# Patient Record
Sex: FEMALE | Race: WHITE | NOT HISPANIC OR LATINO | Employment: UNEMPLOYED | URBAN - METROPOLITAN AREA
[De-identification: names, ages, dates, MRNs, and addresses within clinical notes are randomized per-mention and may not be internally consistent; named-entity substitution may affect disease eponyms.]

---

## 2019-04-16 LAB — EXTERNAL CHLAMYDIA RESULT: NOT DETECTED

## 2019-04-19 LAB
EXTERNAL HIV SCREEN: NORMAL
HCV AB SER-ACNC: NONREACTIVE

## 2021-06-30 ENCOUNTER — OFFICE VISIT (OUTPATIENT)
Dept: OBGYN CLINIC | Facility: CLINIC | Age: 36
End: 2021-06-30
Payer: COMMERCIAL

## 2021-06-30 ENCOUNTER — VBI (OUTPATIENT)
Dept: ADMINISTRATIVE | Facility: OTHER | Age: 36
End: 2021-06-30

## 2021-06-30 VITALS
DIASTOLIC BLOOD PRESSURE: 62 MMHG | WEIGHT: 122.6 LBS | SYSTOLIC BLOOD PRESSURE: 110 MMHG | BODY MASS INDEX: 23.15 KG/M2 | HEIGHT: 61 IN

## 2021-06-30 DIAGNOSIS — Q64.70 ABNORMALITY OF URETHRAL MEATUS: Primary | ICD-10-CM

## 2021-06-30 PROCEDURE — 99214 OFFICE O/P EST MOD 30 MIN: CPT | Performed by: OBSTETRICS & GYNECOLOGY

## 2021-06-30 NOTE — TELEPHONE ENCOUNTER
Upon review of the In Basket request we were able to locate, review, and update the patient chart as requested for Chlamydia, Hepatitis C , HIV and Pap Smear (HPV) aka Cervical Cancer Screening  Any additional questions or concerns should be emailed to the Practice Liaisons via [email or web address] email, please do not reply via In Basket      Thank you  Poonam Woodruff

## 2021-07-02 NOTE — PROGRESS NOTES
Assessment/Plan:      Diagnoses and all orders for this visit:    Abnormality of urethral meatus      Informed patient of exam being negative for prolapse of any kind  Prominent urethral meatus might be a normal variance but patient believes the appearance have changed since delivery  Advised pt to consult Urologist or Uro/Gyn for further evaluation  At least 30 minutes spent in face to face conversation and exam   Additional time spent reviewing pt's delivery records and documenting    Subjective:     Patient ID: Marilee Correa is a 39 y o  female  Pt presents due concern about having uterine prolapse  Pt states her friend had talked about uterine prolapse last week and last night patient heard/felt a pop in her pelvis/vagina and patient feels something bulging out of her vagina  Denies having any pelvic pain, increased frequency, urgency, leakage of urine with valsalva  Pt states she had Forceps assisted vaginal delivery and concerned that she might have weak/stretched pelvic muscles due to forceps use  Review of Systems   Constitutional: Negative for activity change  Genitourinary: Negative for difficulty urinating, dyspareunia, dysuria, frequency, urgency and vaginal pain  Objective:     Physical Exam  Vitals and nursing note reviewed  HENT:      Head: Normocephalic  Abdominal:      General: There is no distension  Palpations: Abdomen is soft  There is no mass  Tenderness: There is no abdominal tenderness  There is no rebound  Genitourinary:     General: Normal vulva  Exam position: Lithotomy position  Labia:         Right: No rash, tenderness or lesion  Left: No rash, tenderness or lesion  Urethra: No urethral pain or urethral lesion  Vagina: Normal  No vaginal discharge  Cervix: No cervical motion tenderness, lesion or erythema        Uterus: Normal        Adnexa: Right adnexa normal and left adnexa normal       Rectum: No external hemorrhoid  Comments: Prominent urethral meatus, no erythema or tenderness or discharge  Musculoskeletal:      Right lower leg: No edema  Left lower leg: No edema  Skin:     General: Skin is warm  Neurological:      Mental Status: She is alert and oriented to person, place, and time  Psychiatric:         Mood and Affect: Mood normal          Behavior: Behavior normal          Thought Content:  Thought content normal

## 2021-12-10 ENCOUNTER — INITIAL PRENATAL (OUTPATIENT)
Dept: OBGYN CLINIC | Facility: CLINIC | Age: 36
End: 2021-12-10

## 2021-12-10 VITALS
WEIGHT: 124 LBS | DIASTOLIC BLOOD PRESSURE: 80 MMHG | SYSTOLIC BLOOD PRESSURE: 118 MMHG | BODY MASS INDEX: 23.41 KG/M2 | HEIGHT: 61 IN

## 2021-12-10 DIAGNOSIS — Z3A.09 9 WEEKS GESTATION OF PREGNANCY: ICD-10-CM

## 2021-12-10 DIAGNOSIS — Z36.89 ENCOUNTER FOR OTHER SPECIFIED ANTENATAL SCREENING: Primary | ICD-10-CM

## 2021-12-10 DIAGNOSIS — O09.521 ELDERLY MULTIGRAVIDA IN FIRST TRIMESTER: ICD-10-CM

## 2021-12-10 LAB
EXTERNAL CHLAMYDIA SCREEN: NEGATIVE
EXTERNAL GONORRHEA SCREEN: NEGATIVE
SL AMB  POCT GLUCOSE, UA: NORMAL
SL AMB POCT URINE PROTEIN: NORMAL

## 2021-12-10 PROCEDURE — 99024 POSTOP FOLLOW-UP VISIT: CPT | Performed by: OBSTETRICS & GYNECOLOGY

## 2021-12-10 PROCEDURE — OBC: Performed by: OBSTETRICS & GYNECOLOGY

## 2021-12-11 LAB
C TRACH RRNA SPEC QL NAA+PROBE: NEGATIVE
N GONORRHOEA RRNA SPEC QL NAA+PROBE: NEGATIVE

## 2021-12-17 ENCOUNTER — TELEMEDICINE (OUTPATIENT)
Dept: PERINATAL CARE | Facility: CLINIC | Age: 36
End: 2021-12-17

## 2021-12-17 DIAGNOSIS — Z31.5 ENCOUNTER FOR PROCREATIVE GENETIC COUNSELING: ICD-10-CM

## 2021-12-17 DIAGNOSIS — O09.529 ANTEPARTUM MULTIGRAVIDA OF ADVANCED MATERNAL AGE: Primary | ICD-10-CM

## 2021-12-17 LAB
EXTERNAL ANTIBODY SCREEN: NORMAL
EXTERNAL HEMOGLOBIN: 12.6 G/DL
EXTERNAL HEPATITIS B SURFACE ANTIGEN: NEGATIVE
EXTERNAL HIV-1/2 AB-AG: NORMAL
EXTERNAL PLATELET COUNT: 230 K/ΜL
EXTERNAL RH FACTOR: POSITIVE
EXTERNAL RUBELLA IGG QUANTITATION: NORMAL
EXTERNAL SYPHILIS RPR SCREEN: NORMAL

## 2021-12-19 LAB
ABO GROUP BLD: ABNORMAL
APPEARANCE UR: CLEAR
BACTERIA UR CULT: NORMAL
BACTERIA URNS QL MICRO: NORMAL
BASOPHILS # BLD AUTO: 0 X10E3/UL (ref 0–0.2)
BASOPHILS NFR BLD AUTO: 0 %
BILIRUB UR QL STRIP: NEGATIVE
BLD GP AB SCN SERPL QL: NEGATIVE
CASTS URNS QL MICRO: NORMAL /LPF
COLOR UR: YELLOW
EOSINOPHIL # BLD AUTO: 0.2 X10E3/UL (ref 0–0.4)
EOSINOPHIL NFR BLD AUTO: 2 %
EPI CELLS #/AREA URNS HPF: NORMAL /HPF (ref 0–10)
ERYTHROCYTE [DISTWIDTH] IN BLOOD BY AUTOMATED COUNT: 11.9 % (ref 11.7–15.4)
GLUCOSE UR QL: NEGATIVE
HBV SURFACE AG SERPL QL IA: NEGATIVE
HCT VFR BLD AUTO: 37.9 % (ref 34–46.6)
HCV AB S/CO SERPL IA: <0.1 S/CO RATIO (ref 0–0.9)
HGB BLD-MCNC: 12.6 G/DL (ref 11.1–15.9)
HGB UR QL STRIP: NEGATIVE
HIV 1+2 AB+HIV1 P24 AG SERPL QL IA: NON REACTIVE
IMM GRANULOCYTES # BLD: 0.1 X10E3/UL (ref 0–0.1)
IMM GRANULOCYTES NFR BLD: 1 %
KETONES UR QL STRIP: NEGATIVE
LEUKOCYTE ESTERASE UR QL STRIP: NEGATIVE
LYMPHOCYTES # BLD AUTO: 2.2 X10E3/UL (ref 0.7–3.1)
LYMPHOCYTES NFR BLD AUTO: 20 %
Lab: NORMAL
MCH RBC QN AUTO: 30 PG (ref 26.6–33)
MCHC RBC AUTO-ENTMCNC: 33.2 G/DL (ref 31.5–35.7)
MCV RBC AUTO: 90 FL (ref 79–97)
MICRO URNS: NORMAL
MICRO URNS: NORMAL
MONOCYTES # BLD AUTO: 0.6 X10E3/UL (ref 0.1–0.9)
MONOCYTES NFR BLD AUTO: 6 %
NEUTROPHILS # BLD AUTO: 7.8 X10E3/UL (ref 1.4–7)
NEUTROPHILS NFR BLD AUTO: 71 %
NITRITE UR QL STRIP: NEGATIVE
PH UR STRIP: 7 [PH] (ref 5–7.5)
PLATELET # BLD AUTO: 230 X10E3/UL (ref 150–450)
PROT UR QL STRIP: NEGATIVE
RBC # BLD AUTO: 4.2 X10E6/UL (ref 3.77–5.28)
RBC #/AREA URNS HPF: NORMAL /HPF (ref 0–2)
RH BLD: POSITIVE
RPR SER QL: NON REACTIVE
RUBV IGG SERPL IA-ACNC: <0.9 INDEX
SP GR UR: 1.01 (ref 1–1.03)
UROBILINOGEN UR STRIP-ACNC: 0.2 MG/DL (ref 0.2–1)
WBC # BLD AUTO: 10.9 X10E3/UL (ref 3.4–10.8)
WBC #/AREA URNS HPF: NORMAL /HPF (ref 0–5)

## 2022-01-07 ENCOUNTER — ROUTINE PRENATAL (OUTPATIENT)
Dept: OBGYN CLINIC | Facility: CLINIC | Age: 37
End: 2022-01-07
Payer: COMMERCIAL

## 2022-01-07 ENCOUNTER — ROUTINE PRENATAL (OUTPATIENT)
Dept: PERINATAL CARE | Facility: OTHER | Age: 37
End: 2022-01-07
Payer: COMMERCIAL

## 2022-01-07 VITALS
BODY MASS INDEX: 24.17 KG/M2 | SYSTOLIC BLOOD PRESSURE: 137 MMHG | HEART RATE: 67 BPM | HEIGHT: 61 IN | DIASTOLIC BLOOD PRESSURE: 77 MMHG | WEIGHT: 128 LBS

## 2022-01-07 VITALS
DIASTOLIC BLOOD PRESSURE: 72 MMHG | BODY MASS INDEX: 24.17 KG/M2 | HEIGHT: 61 IN | WEIGHT: 128 LBS | SYSTOLIC BLOOD PRESSURE: 112 MMHG

## 2022-01-07 DIAGNOSIS — Z36.82 ENCOUNTER FOR NUCHAL TRANSLUCENCY TESTING: ICD-10-CM

## 2022-01-07 DIAGNOSIS — Z36.89 ENCOUNTER FOR OTHER SPECIFIED ANTENATAL SCREENING: ICD-10-CM

## 2022-01-07 DIAGNOSIS — O09.521 ELDERLY MULTIGRAVIDA IN FIRST TRIMESTER: ICD-10-CM

## 2022-01-07 DIAGNOSIS — Z23 FLU VACCINE NEED: ICD-10-CM

## 2022-01-07 DIAGNOSIS — Z3A.13 13 WEEKS GESTATION OF PREGNANCY: Primary | ICD-10-CM

## 2022-01-07 DIAGNOSIS — O09.521 ELDERLY MULTIGRAVIDA IN FIRST TRIMESTER: Primary | ICD-10-CM

## 2022-01-07 DIAGNOSIS — Z3A.13 13 WEEKS GESTATION OF PREGNANCY: ICD-10-CM

## 2022-01-07 PROBLEM — O09.899 RUBELLA NON-IMMUNE STATUS, ANTEPARTUM: Status: ACTIVE | Noted: 2022-01-07

## 2022-01-07 PROBLEM — Z3A.09 9 WEEKS GESTATION OF PREGNANCY: Status: RESOLVED | Noted: 2021-12-10 | Resolved: 2022-01-07

## 2022-01-07 PROBLEM — Z28.39 RUBELLA NON-IMMUNE STATUS, ANTEPARTUM: Status: ACTIVE | Noted: 2022-01-07

## 2022-01-07 LAB
SL AMB  POCT GLUCOSE, UA: NORMAL
SL AMB POCT URINE PROTEIN: NORMAL

## 2022-01-07 PROCEDURE — 90686 IIV4 VACC NO PRSV 0.5 ML IM: CPT | Performed by: STUDENT IN AN ORGANIZED HEALTH CARE EDUCATION/TRAINING PROGRAM

## 2022-01-07 PROCEDURE — 99241 PR OFFICE CONSULTATION NEW/ESTAB PATIENT 15 MIN: CPT | Performed by: OBSTETRICS & GYNECOLOGY

## 2022-01-07 PROCEDURE — 76813 OB US NUCHAL MEAS 1 GEST: CPT | Performed by: OBSTETRICS & GYNECOLOGY

## 2022-01-07 PROCEDURE — 90471 IMMUNIZATION ADMIN: CPT | Performed by: STUDENT IN AN ORGANIZED HEALTH CARE EDUCATION/TRAINING PROGRAM

## 2022-01-07 PROCEDURE — 76801 OB US < 14 WKS SINGLE FETUS: CPT | Performed by: OBSTETRICS & GYNECOLOGY

## 2022-01-07 PROCEDURE — PNV: Performed by: STUDENT IN AN ORGANIZED HEALTH CARE EDUCATION/TRAINING PROGRAM

## 2022-01-07 NOTE — PROGRESS NOTES
Routine Prenatal Visit  [address], [address], [place] [place], [address]    Assessment/Plan:  David Allen is a 39y o  year old  at 13w0d who presents for routine prenatal visit  1  13 weeks gestation of pregnancy  -     POCT urine dip    2  Encounter for other specified  screening  -     Alpha fetoprotein, maternal; Future  -     Alpha fetoprotein, maternal    3  Elderly multigravida in first trimester  Assessment & Plan:  - PTL/PPROM/Bleeding precautions given  - Early anatomy US performed this morning with MFM; report pending  Level II ultrasound scheduled  - COVID vaccination recommended  - Flu shot administered today  - msAFP order provided  Reviewed timing 15-20 weeks (16-18 preferred)  - Problem list updated  - PMH, PSH, medications reviewed and updated as needed  - Return to office in 4 wk(s) for routine prenatal care        4  Flu vaccine need  -     FLUZONE: influenza vaccine, quadrivalent, 0 5 mL      Subjective:   Connie Connor is a 39 y o  Jamala Dubin who presents for routine prenatal care at 13w0d  Complaints today: No  LOF: No; VB: No; Contractions: No; FM: Too early    Objective:  /72 (BP Location: Left arm, Patient Position: Sitting, Cuff Size: Standard)   Ht 5' 1" (1 549 m)   Wt 58 1 kg (128 lb)   LMP 10/02/2021 (Exact Date)   BMI 24 19 kg/m²     General: Well appearing, no distress  Respiratory: Unlabored breathing  Cardiovascular: Regular rate  Abdomen: Soft, gravid, nontender  Extremities: Warm and well perfused  Non tender  Pregravid Weight/BMI: Pregravid weight not on file (BMI Could not be calculated)  Current Weight: 58 1 kg (128 lb)   Total Weight Gain: Not found       Pre- Vitals      Most Recent Value   Prenatal Assessment    Prenatal Vitals    Blood Pressure 112/72   Weight - Scale 58 1 kg (128 lb)   Urine Albumin/Glucose    Dilation/Effacement/Station    Vaginal Drainage    Edema            Sarah Amado MD  2022 1:07 PM

## 2022-01-07 NOTE — LETTER
January 21, 2022     MD Lan Munoz [address]  [address]  [place]    Patient: Mandy Dukes   YOB: 1985   Date of Visit: 1/7/2022       Dear Dr Arthur Apo: Thank you for referring Yi Palmer to me for evaluation  Below are my notes for this consultation  If you have questions, please do not hesitate to call me  I look forward to following your patient along with you  Sincerely,        Sandra Ruano MD        CC: No Recipients  Sandra Ruano MD  1/21/2022  8:16 AM  Sign when Signing Visit  [id number]  MD Lan Gonzalez [address]  [address]  [place],  [address]     Thank you for referring your Mandy Dukes for a Maternal-Fetal Medicine Consultation:  Below is my consultation  Thank you very much for requesting a consultation on this very nice patient for the indication genetic screening  This is the patient's 2nd pregnancy  First pregnancy resulted in a full-term vaginal delivery without complications  She has no significant medical or surgical history  Substance use history is unremarkable and family medical history is unremarkable  A review of systems is otherwise negative  The patient had noninvasive prenatal testing through [address] plus test   Her results were normal, placing her in a very low risk category  The sensitivity of detecting Trisomy 21 with this test is 99 1% with a specificity of 99 9%  The sensitivity of detecting Trisomy 18 is >99 9% with a specificity of 99 6%  The sensitivity of detecting Trisomy 13 is 91 7% with a specificity of 99 7%  Her negative result is very reassuring that the likelihood of her having a fetus with the aforementioned Trisomies is very low  We discussed current data surrounding COVID-19 vaccination  We discussed follow-up in detail and I recommend she return at 20 weeks for a detailed fetal anatomic evaluation      Thank you very much for allowing us to participate in the care of this very nice patient  Should you have any questions, please do not hesitate to contact our office  Please note, in addition to the time spent discussing the results of the ultrasound, I spent approximately 15 minutes of face-to-face time with the patient, greater than 50% of which was spent in counseling and the coordination of care for this patient  Akshat Thomas MD  Attending Physician, [place]

## 2022-01-07 NOTE — ASSESSMENT & PLAN NOTE
- PTL/PPROM/Bleeding precautions given  - Early anatomy US performed this morning with MFM; report pending  Level II ultrasound scheduled  - COVID vaccination recommended  - Flu shot administered today  - msAFP order provided   Reviewed timing 15-20 weeks (16-18 preferred)  - Problem list updated  - PMH, PSH, medications reviewed and updated as needed  - Return to office in 4 wk(s) for routine prenatal care

## 2022-01-21 NOTE — PROGRESS NOTES
CONSULT NOTE    Efra Del Rosario MD  [facility] [address]  [address]  [place],  [address]     Thank you for referring your Clearnce Sportsman for a Maternal-Fetal Medicine Consultation:  Below is my consultation  Thank you very much for requesting a consultation on this very nice patient for the indication genetic screening  This is the patient's 2nd pregnancy  First pregnancy resulted in a full-term vaginal delivery without complications  She has no significant medical or surgical history  Substance use history is unremarkable and family medical history is unremarkable  A review of systems is otherwise negative  The patient had noninvasive prenatal testing through [address] [facility] plus test   Her results were normal, placing her in a very low risk category  The sensitivity of detecting Trisomy 21 with this test is 99 1% with a specificity of 99 9%  The sensitivity of detecting Trisomy 18 is >99 9% with a specificity of 99 6%  The sensitivity of detecting Trisomy 13 is 91 7% with a specificity of 99 7%  Her negative result is very reassuring that the likelihood of her having a fetus with the aforementioned Trisomies is very low  We discussed current data surrounding COVID-19 vaccination  We discussed follow-up in detail and I recommend she return at 20 weeks for a detailed fetal anatomic evaluation  Thank you very much for allowing us to participate in the care of this very nice patient  Should you have any questions, please do not hesitate to contact our office  Please note, in addition to the time spent discussing the results of the ultrasound, I spent approximately 15 minutes of face-to-face time with the patient, greater than 50% of which was spent in counseling and the coordination of care for this patient  Akshat Haro MD  Attending Physician, [place]

## 2022-01-31 LAB
2ND TRIMESTER 4 SCREEN SERPL-IMP: NORMAL
AFP ADJ MOM SERPL: 0.47
AFP INTERP AMN-IMP: NORMAL
AFP INTERP SERPL-IMP: NORMAL
AFP INTERP SERPL-IMP: NORMAL
AFP SERPL-MCNC: 17.7 NG/ML
AGE AT DELIVERY: 37.5 YR
GA METHOD: NORMAL
GA: 16 WEEKS
IDDM PATIENT QL: NO
MULTIPLE PREGNANCY: NO
NEURAL TUBE DEFECT RISK FETUS: NORMAL %

## 2022-02-04 ENCOUNTER — ROUTINE PRENATAL (OUTPATIENT)
Dept: OBGYN CLINIC | Facility: CLINIC | Age: 37
End: 2022-02-04

## 2022-02-04 VITALS
SYSTOLIC BLOOD PRESSURE: 98 MMHG | WEIGHT: 135.2 LBS | BODY MASS INDEX: 25.53 KG/M2 | HEIGHT: 61 IN | DIASTOLIC BLOOD PRESSURE: 60 MMHG

## 2022-02-04 DIAGNOSIS — O09.521 ELDERLY MULTIGRAVIDA IN FIRST TRIMESTER: Primary | ICD-10-CM

## 2022-02-04 DIAGNOSIS — O09.899 RUBELLA NON-IMMUNE STATUS, ANTEPARTUM: ICD-10-CM

## 2022-02-04 DIAGNOSIS — Z28.39 RUBELLA NON-IMMUNE STATUS, ANTEPARTUM: ICD-10-CM

## 2022-02-04 DIAGNOSIS — Z3A.17 17 WEEKS GESTATION OF PREGNANCY: ICD-10-CM

## 2022-02-04 LAB
SL AMB  POCT GLUCOSE, UA: NEGATIVE
SL AMB POCT URINE PROTEIN: NEGATIVE

## 2022-02-04 PROCEDURE — PNV: Performed by: STUDENT IN AN ORGANIZED HEALTH CARE EDUCATION/TRAINING PROGRAM

## 2022-02-04 NOTE — PROGRESS NOTES
Routine Prenatal Visit  [address] [facility], [address], [facility], [address]    Assessment/Plan:  Ash Smith is a 40y o  year old  at 17w0d who presents for routine prenatal visit  1  Elderly multigravida in first trimester    2  Rubella non-immune status, antepartum  Assessment & Plan:  - Plan to vaccinate postpartum  Patient aware  3  17 weeks gestation of pregnancy  -     POCT urine dip    - Prenatal lab results reviewed  - Aneuploidy/ONTD screening reviewed  - First trimester MFM consult report reviewed  Level II ultrasound is scheduled  - Patient has previously been counseled regarding recommendation for vaccination against [id number] and has declined  Flu vaccine previously administered  - Problem list updated, results console reviewed and updated with pertinent prenatal labs  - PMH, PSH, medications reviewed and updated as needed  - Return to office in 4 wk(s) for routine prenatal care  Subjective:   Robert Jacome is a 40 y o   who presents for routine prenatal care at 17w0d  Complaints today: None  LOF: No; VB: No; Contractions: No; FM: Flutters    Objective:  BP 98/60 (BP Location: Left arm, Patient Position: Sitting, Cuff Size: Standard)   Ht 5' 1" (1 549 m)   Wt 61 3 kg (135 lb 3 2 oz)   LMP 10/02/2021 (Exact Date)   BMI 25 55 kg/m²     General: Well appearing, no distress  Respiratory: Unlabored breathing  Cardiovascular: Regular rate  Abdomen: Soft, gravid, nontender  Extremities: Warm and well perfused  Non tender  Pregravid Weight/BMI: Pregravid weight not on file (BMI Could not be calculated)  Current Weight: 61 3 kg (135 lb 3 2 oz)   Total Weight Gain: Not found       Pre-Jack Vitals      Most Recent Value   Prenatal Assessment    Fetal Heart Rate 150   Prenatal Vitals    Blood Pressure 98/60   Weight - Scale 61 3 kg (135 lb 3 2 oz)   Urine Albumin/Glucose    Dilation/Effacement/Station    Vaginal Drainage    Draining Fluid No Edema    LLE Edema None   RLE Edema None   Facial Edema None           Gabriela Martinez MD  2/4/2022 8:53 AM

## 2022-02-24 ENCOUNTER — TELEPHONE (OUTPATIENT)
Dept: PERINATAL CARE | Facility: CLINIC | Age: 37
End: 2022-02-24

## 2022-02-24 NOTE — TELEPHONE ENCOUNTER
Left voicemail for pt that we are cancelling 2/25/22 appointment due to expected inclement weather  We will call pt back to reschedule

## 2022-03-01 PROBLEM — O09.529 ANTEPARTUM MULTIGRAVIDA OF ADVANCED MATERNAL AGE: Status: ACTIVE | Noted: 2021-12-10

## 2022-03-02 ENCOUNTER — ROUTINE PRENATAL (OUTPATIENT)
Dept: OBGYN CLINIC | Facility: CLINIC | Age: 37
End: 2022-03-02

## 2022-03-02 VITALS
WEIGHT: 140 LBS | SYSTOLIC BLOOD PRESSURE: 90 MMHG | HEIGHT: 61 IN | DIASTOLIC BLOOD PRESSURE: 58 MMHG | BODY MASS INDEX: 26.43 KG/M2

## 2022-03-02 DIAGNOSIS — O09.529 ANTEPARTUM MULTIGRAVIDA OF ADVANCED MATERNAL AGE: Primary | ICD-10-CM

## 2022-03-02 DIAGNOSIS — O09.899 RUBELLA NON-IMMUNE STATUS, ANTEPARTUM: ICD-10-CM

## 2022-03-02 DIAGNOSIS — Z3A.20 20 WEEKS GESTATION OF PREGNANCY: ICD-10-CM

## 2022-03-02 DIAGNOSIS — Z28.39 RUBELLA NON-IMMUNE STATUS, ANTEPARTUM: ICD-10-CM

## 2022-03-02 LAB
SL AMB  POCT GLUCOSE, UA: NEGATIVE
SL AMB POCT URINE PROTEIN: NEGATIVE

## 2022-03-02 PROCEDURE — PNV: Performed by: OBSTETRICS & GYNECOLOGY

## 2022-03-02 NOTE — ASSESSMENT & PLAN NOTE
Anatomy u/s scheduled 3/7/2022 with [facility] 
Vaccinate PP
cfDNA testing normal
Writer observed down trending glucose in pt that is NPO, not tolerating feeds, recent history of profound hypoglycemia with ESRD limiting volume intake.

## 2022-03-02 NOTE — PROGRESS NOTES
Routine Prenatal Visit  [address] [facility], [address], [facility], [address]    Assessment/Plan:  David Allen is a 40y o  year old  at 20w5d who presents for routine prenatal visit  1  Antepartum multigravida of advanced maternal age  Assessment & Plan:  cfDNA testing normal      2  Rubella non-immune status, antepartum  Assessment & Plan:  Vaccinate PP      3  20 weeks gestation of pregnancy  Assessment & Plan:  Anatomy u/s scheduled 3/7/2022 with [facility]  Orders:  -     POCT urine dip      Next OB Visit 4 weeks  Subjective:     CC: Prenatal care    Connie Connor is a 40 y o   female who presents for routine prenatal care at [facility]  Pregnancy ROS: no leakage of fluid, pelvic pain, or vaginal bleeding  normal fetal movement  The following portions of the patient's history were reviewed and updated as appropriate: allergies, current medications, past family history, past medical history, obstetric history, gynecologic history, past social history, past surgical history and problem list       Objective:  BP 90/58   Ht 5' 1" (1 549 m)   Wt 63 5 kg (140 lb)   LMP 10/02/2021 (Exact Date)   BMI 26 45 kg/m²   Pregravid Weight/BMI: Pregravid weight not on file (BMI Could not be calculated)  Current Weight: 63 5 kg (140 lb)   Total Weight Gain: Not found  Pre-Jack Vitals      Most Recent Value   Prenatal Assessment    Fetal Heart Rate 155   Fundal Height (cm) 20 cm   Movement Present   Prenatal Vitals    Blood Pressure 90/58   Weight - Scale 63 5 kg (140 lb)   Urine Albumin/Glucose    Dilation/Effacement/Station    Vaginal Drainage    Edema    LLE Edema None   RLE Edema None   Facial Edema None           General: Well appearing, no distress  Abdomen: Soft, gravid, nontender  Fundal Height: Appropriate for gestational age  Extremities: Warm and well perfused  Non tender

## 2022-03-07 ENCOUNTER — ROUTINE PRENATAL (OUTPATIENT)
Dept: PERINATAL CARE | Facility: OTHER | Age: 37
End: 2022-03-07
Payer: COMMERCIAL

## 2022-03-07 VITALS
HEIGHT: 61 IN | HEART RATE: 61 BPM | DIASTOLIC BLOOD PRESSURE: 62 MMHG | SYSTOLIC BLOOD PRESSURE: 107 MMHG | WEIGHT: 140.2 LBS | BODY MASS INDEX: 26.47 KG/M2

## 2022-03-07 DIAGNOSIS — Z36.3 ENCOUNTER FOR ANTENATAL SCREENING FOR MALFORMATIONS: ICD-10-CM

## 2022-03-07 DIAGNOSIS — Z36.86 ENCOUNTER FOR ANTENATAL SCREENING FOR CERVICAL LENGTH: ICD-10-CM

## 2022-03-07 DIAGNOSIS — O09.529 ANTEPARTUM MULTIGRAVIDA OF ADVANCED MATERNAL AGE: Primary | ICD-10-CM

## 2022-03-07 PROCEDURE — 76817 TRANSVAGINAL US OBSTETRIC: CPT | Performed by: OBSTETRICS & GYNECOLOGY

## 2022-03-07 PROCEDURE — 76811 OB US DETAILED SNGL FETUS: CPT | Performed by: OBSTETRICS & GYNECOLOGY

## 2022-03-07 NOTE — PATIENT INSTRUCTIONS
Thank you for choosing us for your  care today  If you have any questions about your ultrasound or care, please do not hesitate to contact us or your primary obstetrician  Some general instructions for your pregnancy are:     Protect against coronavirus: get vaccinated - pregnant women are increased risk of severe COVID  Notify your primary care doctor if you have any symptoms   Exercise: Aim for 22 minutes per day (150 minutes per week) of regular exercise  Walking is great!  Nutrition: aim for calcium-rich and iron-rich foods as well as healthy sources of protein   Learn about Preeclampsia: preeclampsia is a common, serious high blood pressure complication in pregnancy  A blood pressure of 763HFHA (systolic or top number) or 73XVKJ (diastolic or bottom number) is not normal and needs evaluation by your doctor  Aspirin is sometimes prescribed in early pregnancy to prevent preeclampsia in women with risk factors - ask your obstetrician if you should be on this medication   If you smoke, try to reduce how many cigarettes you smoke or try to quit completely  Do not vape   Other warning signs to watch out for in pregnancy or postpartum: chest pain, obstructed breathing or shortness of breath, seizures, thoughts of hurting yourself or your baby, bleeding, a painful or swollen leg, fever, or headache (see AWHONN POST-BIRTH Warning Signs campaign)  If these happen call 911  Itching is also not normal in pregnancy and if you experience this, especially over your hands and feet, potentially worse at night, notify your doctors

## 2022-03-08 ENCOUNTER — TELEPHONE (OUTPATIENT)
Dept: PERINATAL CARE | Facility: CLINIC | Age: 37
End: 2022-03-08

## 2022-03-08 NOTE — TELEPHONE ENCOUNTER
Called patient to schedule follow up appointment:     Return in about 11 weeks  (around 5/23/2022) for Growth  Spoke with patient and scheduled appointment - 5/23/22  6:00  UPPER PERK

## 2022-03-29 ENCOUNTER — ROUTINE PRENATAL (OUTPATIENT)
Dept: OBGYN CLINIC | Facility: CLINIC | Age: 37
End: 2022-03-29

## 2022-03-29 VITALS
SYSTOLIC BLOOD PRESSURE: 102 MMHG | BODY MASS INDEX: 26.88 KG/M2 | WEIGHT: 142.4 LBS | DIASTOLIC BLOOD PRESSURE: 62 MMHG | HEIGHT: 61 IN

## 2022-03-29 DIAGNOSIS — O09.899 RUBELLA NON-IMMUNE STATUS, ANTEPARTUM: ICD-10-CM

## 2022-03-29 DIAGNOSIS — Z28.39 RUBELLA NON-IMMUNE STATUS, ANTEPARTUM: ICD-10-CM

## 2022-03-29 DIAGNOSIS — Z3A.24 24 WEEKS GESTATION OF PREGNANCY: Primary | ICD-10-CM

## 2022-03-29 DIAGNOSIS — O09.529 ANTEPARTUM MULTIGRAVIDA OF ADVANCED MATERNAL AGE: ICD-10-CM

## 2022-03-29 DIAGNOSIS — Z36.89 ENCOUNTER FOR OTHER SPECIFIED ANTENATAL SCREENING: ICD-10-CM

## 2022-03-29 LAB
SL AMB  POCT GLUCOSE, UA: NEGATIVE
SL AMB POCT URINE PROTEIN: NEGATIVE

## 2022-03-29 PROCEDURE — PNV: Performed by: OBSTETRICS & GYNECOLOGY

## 2022-03-29 NOTE — PROGRESS NOTES
Routine Prenatal Visit  [address] [facility], [address], [facility], [address]    Assessment/Plan:  Cathy Haider is a 40y o  year old  at 18w2d who presents for routine prenatal visit  1  24 weeks gestation of pregnancy  -     POCT urine dip    2  Encounter for other specified  screening  -     Glucose, 1H PG; Future  -     CBC; Future  -     RPR; Future  -     Glucose, 1H PG  -     CBC  -     RPR    3  20 weeks gestation of pregnancy    4  Rubella non-immune status, antepartum    5  Antepartum multigravida of advanced maternal age      + fm  No UTCX   No leaking of  Fluid  Labs reviewed    Needs   Third trimester  Growth scan  AMA    Subjective:     CC: Prenatal care    Estephanie Mix is a 40 y o   female who presents for routine prenatal care at 24w4d  Pregnancy ROS: no  leakage of fluid, pelvic pain, or vaginal bleeding   + fetal movement  The following portions of the patient's history were reviewed and updated as appropriate: allergies, current medications, past family history, past medical history, obstetric history, gynecologic history, past social history, past surgical history and problem list       Objective:  /62 (BP Location: Left arm, Patient Position: Sitting, Cuff Size: Standard)   Ht 5' 1" (1 549 m)   Wt 64 6 kg (142 lb 6 4 oz)   LMP 10/02/2021 (Exact Date)   BMI 26 91 kg/m²   Pregravid Weight/BMI: 56 2 kg (124 lb) (BMI 23 44)  Current Weight: 64 6 kg (142 lb 6 4 oz)   Total Weight Gain: 8 346 kg (18 lb 6 4 oz)   Pre-Jack Vitals      Most Recent Value   Prenatal Assessment    Prenatal Vitals    Blood Pressure 102/62   Weight - Scale 64 6 kg (142 lb 6 4 oz)   Urine Albumin/Glucose    Dilation/Effacement/Station    Vaginal Drainage    Edema            General: Well appearing, no distress  Respiratory: Unlabored breathing  Cardiovascular: Regular rate    Abdomen: Soft, gravid, nontender  Fundal Height: Appropriate for gestational age   Extremities: Warm and well perfused  Non tender

## 2022-03-29 NOTE — PATIENT INSTRUCTIONS
NUTRITION IN PREGNANCY  Good Nutrition is a VERY important part of having a healthy pregnancy and healthy baby  You should follow a healthy diet which include the following: * Vegetables (which are dark green and leafy): at least 2 servings each day   * Protein (meat, eggs, beans, nuts, peanut butter): 3-4 servings each day   * Breads/whole grains (bread, pasta, rice, tortillas, potatoes): 3 servings each day   * Dairy (milk, yogurt, cheese): 3-4 servings each day   * Water: 6-8 glasses per day   * Calories: approximately 2000 to 2200 calories per day     WEIGHT GAIN   Recommended weight gain for you during your pregnancy is based on your body mass index (BMI) at the time that you became pregnant  Pre-pregnant BMI Recommended weight gain   Underweight (BMI less than 18 5) 28 to 40 pounds   Normal weight (BMI 18 5-24 9) 25 to 35 pounds   Overweight (BMI 25-29 9) 15 to 25 pounds   Obese (BMI 30 or greater) 11 to 20 pounds     FOOD SAFETY   It is VERY important to eat only safely-prepared foods during pregnancy as you and your baby have a higher risk than usual for being affected by foodborne illnesses    Follow these steps to ensure that you and your baby are safe from foodborne illnesses while you are pregnant:   · wash hands thoroughly with warm water and soap before and after handling any foods   · wash cutting boards, dishes, utensils, and countertops with hot water and soap before and after preparing any foods   · rinse raw fruits and vegetables thoroughly under running water before eating   · keep raw meat and seafood separate from other foods and use different cutting boards/utensils to handle raw meat than for other foods   · put cooked food on a freshly clean plate   · cook all of your foods thoroughly   · discard foods that have been left out for more than 2 hours   · refrigerate or freeze any foods than can spoil     There are three particular foodborne risks that you should be aware of and avoid as they can cause serious harm to your unborn child  * Listeria (a harmful bacteria)   · dont eat hot dogs or deli meats (unless theyre reheated until steaming hot)   · dont eat soft cheeses (such as Feta, Brie, Camembert) unless they are specifically labeled as being made with pasteurized milk   · dont drink raw (unpasteurized) milk   · dont eat refrigerated pates or meat spreads   · dont eat refrigerated smoked seafood unless its in a cooked dish like a casserole     * Mercury (a metal which is found in certain fish in high levels)   · dont eat shark, tilefish, maulik mackerel, or swordfish   · dont eat more than 12 ounces per week of shrimp, salmon, pollock, or catfish   · when eating tuna fish, you can have up to 6 ounces per week of canned albacore tuna OR up to 12 ounces of canned light tuna     * Toxoplasma (a harmful parasite)   · cook meat thoroughly before eating   · wear gloves when gardening or handling sand from a childs sandbox   · if you ha tve a cat, have someone else change the litter box while you are pregnant  · if you HAVE to clean it yourself, be sure to wash your hands thoroughly afterwards with warm water and soap     · dont get a NEW cat while you are pregnant

## 2022-04-22 LAB
EXTERNAL HEMOGLOBIN: 12.9 G/DL
EXTERNAL PLATELET COUNT: 225 K/ΜL
EXTERNAL SYPHILIS RPR SCREEN: NORMAL

## 2022-04-23 LAB
ERYTHROCYTE [DISTWIDTH] IN BLOOD BY AUTOMATED COUNT: 12.3 % (ref 11.7–15.4)
GLUCOSE 1H P 50 G GLC PO SERPL-MCNC: 85 MG/DL (ref 65–139)
HCT VFR BLD AUTO: 40.2 % (ref 34–46.6)
HGB BLD-MCNC: 12.9 G/DL (ref 11.1–15.9)
MCH RBC QN AUTO: 30.1 PG (ref 26.6–33)
MCHC RBC AUTO-ENTMCNC: 32.1 G/DL (ref 31.5–35.7)
MCV RBC AUTO: 94 FL (ref 79–97)
PLATELET # BLD AUTO: 225 X10E3/UL (ref 150–450)
RBC # BLD AUTO: 4.28 X10E6/UL (ref 3.77–5.28)
RPR SER QL: NON REACTIVE
WBC # BLD AUTO: 11.8 X10E3/UL (ref 3.4–10.8)

## 2022-04-26 ENCOUNTER — ROUTINE PRENATAL (OUTPATIENT)
Dept: OBGYN CLINIC | Facility: CLINIC | Age: 37
End: 2022-04-26
Payer: COMMERCIAL

## 2022-04-26 VITALS
HEIGHT: 61 IN | SYSTOLIC BLOOD PRESSURE: 100 MMHG | DIASTOLIC BLOOD PRESSURE: 60 MMHG | WEIGHT: 147 LBS | BODY MASS INDEX: 27.75 KG/M2

## 2022-04-26 DIAGNOSIS — O09.529 ANTEPARTUM MULTIGRAVIDA OF ADVANCED MATERNAL AGE: ICD-10-CM

## 2022-04-26 DIAGNOSIS — O09.899 RUBELLA NON-IMMUNE STATUS, ANTEPARTUM: ICD-10-CM

## 2022-04-26 DIAGNOSIS — Z3A.28 28 WEEKS GESTATION OF PREGNANCY: Primary | ICD-10-CM

## 2022-04-26 DIAGNOSIS — Z23 NEED FOR TDAP VACCINATION: ICD-10-CM

## 2022-04-26 DIAGNOSIS — Z28.39 RUBELLA NON-IMMUNE STATUS, ANTEPARTUM: ICD-10-CM

## 2022-04-26 LAB
SL AMB  POCT GLUCOSE, UA: NORMAL
SL AMB POCT URINE PROTEIN: NORMAL

## 2022-04-26 PROCEDURE — 90471 IMMUNIZATION ADMIN: CPT

## 2022-04-26 PROCEDURE — 90715 TDAP VACCINE 7 YRS/> IM: CPT

## 2022-04-26 PROCEDURE — PNV: Performed by: OBSTETRICS & GYNECOLOGY

## 2022-04-26 NOTE — PROGRESS NOTES
Routine Prenatal Visit  [address] [facility], [address], [place], [address]    Assessment/Plan:  Lydia Allen is a 40y o  year old  at 33w3d who presents for routine prenatal visit  1  28 weeks gestation of pregnancy  -     POCT urine dip    2  Rubella non-immune status, antepartum    3  Antepartum multigravida of advanced maternal age    3  Need for Tdap vaccination  -     TDAP VACCINE GREATER THAN OR EQUAL TO 8YO IM    TDAP today ,   Labs reviewed  Normal  1 hour  Glucose  + FM  No UTCX  No leaking of  Fluid  Reviewed  S+S of labor, PIH, PTL   AMa has  32 week  Growthscan  Subjective:     CC: Prenatal care    Grier Romberg is a 40 y o  Saundra Hedge female who presents for routine prenatal care at 28w4d  Pregnancy ROS: no  leakage of fluid, pelvic pain, or vaginal bleeding   + fetal movement  The following portions of the patient's history were reviewed and updated as appropriate: allergies, current medications, past family history, past medical history, obstetric history, gynecologic history, past social history, past surgical history and problem list       Objective:  /60   Ht 5' 1" (1 549 m)   Wt 66 7 kg (147 lb)   LMP 10/02/2021 (Exact Date)   BMI 27 78 kg/m²   Pregravid Weight/BMI: 56 2 kg (124 lb) (BMI 23 44)  Current Weight: 66 7 kg (147 lb)   Total Weight Gain: 10 4 kg (23 lb)   Pre- Vitals      Most Recent Value   Prenatal Assessment    Fetal Heart Rate 144-156   Fundal Height (cm) 28 cm   Movement Present   Prenatal Vitals    Blood Pressure 100/60   Weight - Scale 66 7 kg (147 lb)   Urine Albumin/Glucose    Dilation/Effacement/Station    Vaginal Drainage    Draining Fluid No   Edema    LLE Edema None   RLE Edema None   Facial Edema None           General: Well appearing, no distress  Respiratory: Unlabored breathing  Cardiovascular: Regular rate  Abdomen: Soft, gravid, nontender  Fundal Height: Appropriate for gestational age    Extremities: Warm and well perfused  Non tender

## 2022-05-11 ENCOUNTER — ROUTINE PRENATAL (OUTPATIENT)
Dept: OBGYN CLINIC | Facility: CLINIC | Age: 37
End: 2022-05-11

## 2022-05-11 VITALS
DIASTOLIC BLOOD PRESSURE: 70 MMHG | WEIGHT: 150 LBS | BODY MASS INDEX: 28.32 KG/M2 | SYSTOLIC BLOOD PRESSURE: 100 MMHG | HEIGHT: 61 IN

## 2022-05-11 DIAGNOSIS — Z28.39 RUBELLA NON-IMMUNE STATUS, ANTEPARTUM: ICD-10-CM

## 2022-05-11 DIAGNOSIS — O09.899 RUBELLA NON-IMMUNE STATUS, ANTEPARTUM: ICD-10-CM

## 2022-05-11 DIAGNOSIS — O09.529 ANTEPARTUM MULTIGRAVIDA OF ADVANCED MATERNAL AGE: Primary | ICD-10-CM

## 2022-05-11 DIAGNOSIS — Z3A.30 30 WEEKS GESTATION OF PREGNANCY: ICD-10-CM

## 2022-05-11 LAB
SL AMB  POCT GLUCOSE, UA: NORMAL
SL AMB POCT URINE PROTEIN: NORMAL

## 2022-05-11 PROCEDURE — PNV: Performed by: OBSTETRICS & GYNECOLOGY

## 2022-05-11 NOTE — PROGRESS NOTES
Routine Prenatal Visit  [address] [facility], [address], [facility], [address]    Assessment/Plan:  Bobbi Najera is a 40y o  year old  at 30w5d who presents for routine prenatal visit  1  Antepartum multigravida of advanced maternal age  Assessment & Plan:  Follow up growth u/s at 32 weeks scheduled 2022      2  Rubella non-immune status, antepartum    3  30 weeks gestation of pregnancy  -     POCT urine dip      Next OB Visit 2 weeks  Subjective:     CC: Prenatal care    Shilpa Cordero is a 40 y o  [place] female who presents for routine prenatal care at 30w5d  Pregnancy ROS: no leakage of fluid, pelvic pain, or vaginal bleeding  normal fetal movement  The following portions of the patient's history were reviewed and updated as appropriate: allergies, current medications, past family history, past medical history, obstetric history, gynecologic history, past social history, past surgical history and problem list       Objective:  /70 (BP Location: Left arm, Patient Position: Sitting, Cuff Size: Standard)   Ht 5' 1" (1 549 m)   Wt 68 kg (150 lb)   LMP 10/02/2021 (Exact Date)   BMI 28 34 kg/m²   Pregravid Weight/BMI: 56 2 kg (124 lb) (BMI 23 44)  Current Weight: 68 kg (150 lb)   Total Weight Gain: 11 8 kg (26 lb)   Pre- Vitals    Flowsheet Row Most Recent Value   Prenatal Assessment    Fetal Heart Rate 155   Fundal Height (cm) 30 cm   Movement Present   Presentation Vertex   Prenatal Vitals    Blood Pressure 100/70   Weight - Scale 68 kg (150 lb)   Urine Albumin/Glucose    Dilation/Effacement/Station    Vaginal Drainage    Edema    LLE Edema None   RLE Edema None           General: Well appearing, no distress  Abdomen: Soft, gravid, nontender  Fundal Height: Appropriate for gestational age  Extremities: Warm and well perfused  Non tender

## 2022-05-21 NOTE — PROGRESS NOTES
Please refer to the [facility] ultrasound report in Ob Procedures for additional information regarding today's visit

## 2022-05-23 ENCOUNTER — ULTRASOUND (OUTPATIENT)
Dept: PERINATAL CARE | Facility: OTHER | Age: 37
End: 2022-05-23
Payer: COMMERCIAL

## 2022-05-23 VITALS
HEART RATE: 76 BPM | BODY MASS INDEX: 28.89 KG/M2 | SYSTOLIC BLOOD PRESSURE: 132 MMHG | WEIGHT: 153 LBS | DIASTOLIC BLOOD PRESSURE: 64 MMHG | HEIGHT: 61 IN

## 2022-05-23 DIAGNOSIS — Z3A.32 32 WEEKS GESTATION OF PREGNANCY: Primary | ICD-10-CM

## 2022-05-23 DIAGNOSIS — O09.523 ELDERLY MULTIGRAVIDA, THIRD TRIMESTER: ICD-10-CM

## 2022-05-23 PROCEDURE — 76816 OB US FOLLOW-UP PER FETUS: CPT | Performed by: OBSTETRICS & GYNECOLOGY

## 2022-05-23 NOTE — LETTER
May 23, 2022     MD Lan Ha [address]  [address] [address],[address]  [facility] [address]    Patient: Shira Vasquez   YOB: 1985   Date of Visit: 5/23/2022       Dear Dr Minor Prado: Thank you for referring Pranay Ayala to me for evaluation  Below are my notes for this consultation  If you have questions, please do not hesitate to call me  I look forward to following your patient along with you  Sincerely,        Lars Gomez MD        CC: No Recipients  Lars Gomez MD  5/21/2022  5:05 PM  Sign when Signing Visit  Please refer to the [facility] ultrasound report in Ob Procedures for additional information regarding today's visit

## 2022-05-23 NOTE — PATIENT INSTRUCTIONS
Kick Counts in Pregnancy   WHAT YOU NEED TO KNOW:   Kick counts measure how much your baby is moving in your womb  A kick from your baby can be felt as a twist, turn, swish, roll, or jab  It is common to feel your baby kicking at 26 to 28 weeks of pregnancy  You may feel your baby kick as early as 20 weeks of pregnancy  You may want to start counting at 28 weeks  DISCHARGE INSTRUCTIONS:   Contact your doctor immediately if:   You feel a change in the number of kicks or movements of your baby  You feel fewer than 10 kicks within 2 hours  You have questions or concerns about your baby's movements  Why measure kick counts:  Your baby's movement may provide information about your baby's health  He or she may move less, or not at all, if there are problems  Your baby may move less if he or she is not getting enough oxygen or nutrition from the placenta  Do not smoke while you are pregnant  Smoking decreases the amount of oxygen that gets to your baby  Talk to your healthcare provider if you need help to quit smoking  Tell your healthcare provider as soon as you feel a change in your baby's movements  When to measure kick counts:   Measure kick counts at the same time every day  Measure kick counts when your baby is awake and most active  Your baby may be most active in the evening  How to measure kick counts:  Check that your baby is awake before you measure kick counts  You can wake up your baby by lightly pushing on your belly, walking, or drinking something cold  Your healthcare provider may tell you different ways to measure kick counts  You may be told to do the following:  Use a chart or clock to keep track of the time you start and finish counting  Sit in a chair or lie on your left side  Place your hands on the largest part of your belly  Count until you reach 10 kicks  Write down how much time it takes to count 10 kicks  It may take 30 minutes to 2 hours to count 10 kicks  It should not take more than 2 hours to count 10 kicks  Follow up with your doctor as directed:  Write down your questions so you remember to ask them during your visits  © Copyright [employer] 2022 Information is for End User's use only and may not be sold, redistributed or otherwise used for commercial purposes  All illustrations and images included in CareNotes® are the copyrighted property of A D A M , Inc  or [facility] Prabhakar Leavitt   The above information is an  only  It is not intended as medical advice for individual conditions or treatments  Talk to your doctor, nurse or pharmacist before following any medical regimen to see if it is safe and effective for you

## 2022-05-25 ENCOUNTER — ROUTINE PRENATAL (OUTPATIENT)
Dept: OBGYN CLINIC | Facility: CLINIC | Age: 37
End: 2022-05-25

## 2022-05-25 VITALS
BODY MASS INDEX: 28.66 KG/M2 | DIASTOLIC BLOOD PRESSURE: 68 MMHG | WEIGHT: 151.8 LBS | SYSTOLIC BLOOD PRESSURE: 104 MMHG | HEIGHT: 61 IN

## 2022-05-25 DIAGNOSIS — O09.899 RUBELLA NON-IMMUNE STATUS, ANTEPARTUM: ICD-10-CM

## 2022-05-25 DIAGNOSIS — Z3A.32 32 WEEKS GESTATION OF PREGNANCY: ICD-10-CM

## 2022-05-25 DIAGNOSIS — O09.529 ANTEPARTUM MULTIGRAVIDA OF ADVANCED MATERNAL AGE: Primary | ICD-10-CM

## 2022-05-25 DIAGNOSIS — Z28.39 RUBELLA NON-IMMUNE STATUS, ANTEPARTUM: ICD-10-CM

## 2022-05-25 LAB
SL AMB  POCT GLUCOSE, UA: NEGATIVE
SL AMB POCT URINE PROTEIN: NEGATIVE

## 2022-05-25 PROCEDURE — PNV: Performed by: OBSTETRICS & GYNECOLOGY

## 2022-05-25 NOTE — PROGRESS NOTES
Routine Prenatal Visit  [address] [facility], [address], [facility], [address]    Assessment/Plan:  Vishal Burkett is a 40y o  year old  at [address] [facility] who presents for routine prenatal visit  1  Antepartum multigravida of advanced maternal age  Assessment & Plan:  Fetal growth U/S 2 days ago shows adequate growth  No follow up needed      2  32 weeks gestation of pregnancy  -     POCT urine dip    3  Rubella non-immune status, antepartum        Subjective:     CC: Prenatal care    Teresa Coon is a 40 y o   female who presents for routine prenatal care at 32w5d  Pregnancy ROS: no leakage of fluid, pelvic pain, or vaginal bleeding  normal fetal movement  The following portions of the patient's history were reviewed and updated as appropriate: allergies, current medications, past family history, past medical history, obstetric history, gynecologic history, past social history, past surgical history and problem list       Objective:  /68 (BP Location: Left arm, Patient Position: Sitting, Cuff Size: Standard)   Ht 5' 1" (1 549 m)   Wt 68 9 kg (151 lb 12 8 oz)   LMP 10/02/2021 (Exact Date)   BMI 28 68 kg/m²   Pregravid Weight/BMI: 56 2 kg (124 lb) (BMI 23 44)  Current Weight: 68 9 kg (151 lb 12 8 oz)   Total Weight Gain: 12 6 kg (27 lb 12 8 oz)   Pre-Jack Vitals    Flowsheet Row Most Recent Value   Prenatal Assessment    Prenatal Vitals    Blood Pressure 104/68   Weight - Scale 68 9 kg (151 lb 12 8 oz)   Urine Albumin/Glucose    Dilation/Effacement/Station    Vaginal Drainage    Edema            General: Well appearing, no distress  Respiratory: Unlabored breathing  Cardiovascular: Regular rate  Abdomen: Soft, gravid, nontender  Fundal Height: Appropriate for gestational age  Extremities: Warm and well perfused  Non tender

## 2022-06-08 ENCOUNTER — ROUTINE PRENATAL (OUTPATIENT)
Dept: OBGYN CLINIC | Facility: CLINIC | Age: 37
End: 2022-06-08

## 2022-06-08 VITALS — WEIGHT: 153 LBS | BODY MASS INDEX: 28.91 KG/M2 | DIASTOLIC BLOOD PRESSURE: 70 MMHG | SYSTOLIC BLOOD PRESSURE: 110 MMHG

## 2022-06-08 DIAGNOSIS — O09.899 RUBELLA NON-IMMUNE STATUS, ANTEPARTUM: ICD-10-CM

## 2022-06-08 DIAGNOSIS — Z3A.34 34 WEEKS GESTATION OF PREGNANCY: ICD-10-CM

## 2022-06-08 DIAGNOSIS — O09.523 MULTIGRAVIDA OF ADVANCED MATERNAL AGE IN THIRD TRIMESTER: Primary | ICD-10-CM

## 2022-06-08 DIAGNOSIS — Z28.39 RUBELLA NON-IMMUNE STATUS, ANTEPARTUM: ICD-10-CM

## 2022-06-08 LAB
SL AMB  POCT GLUCOSE, UA: NORMAL
SL AMB POCT URINE PROTEIN: NORMAL

## 2022-06-08 PROCEDURE — PNV: Performed by: OBSTETRICS & GYNECOLOGY

## 2022-06-08 NOTE — PROGRESS NOTES
Routine Prenatal Visit  [address] [facility], [address], [facility], [address] [facility]    Assessment/Plan:  Marcelino Isbell is a 40y o  year old  at 34w5d who presents for routine prenatal visit  3  Multigravida of advanced maternal age in third trimester    2  Rubella non-immune status, antepartum    3  34 weeks gestation of pregnancy  Assessment & Plan:  GBS/Consent next visit    Orders:  -     POCT urine dip          Subjective:     CC: Prenatal care    Shay Suggs is a 40 y o  [employer] female who presents for routine prenatal care at 34w5d  Pregnancy ROS: no leakage of fluid, pelvic pain, or vaginal bleeding  normal fetal movement  The following portions of the patient's history were reviewed and updated as appropriate: allergies, current medications, past family history, past medical history, obstetric history, gynecologic history, past social history, past surgical history and problem list       Objective:  /70   Wt 69 4 kg (153 lb)   LMP 10/02/2021 (Exact Date)   BMI 28 91 kg/m²   Pregravid Weight/BMI: 56 2 kg (124 lb) (BMI 23 44)  Current Weight: 69 4 kg (153 lb)   Total Weight Gain: 13 2 kg (29 lb)   Pre-Jack Vitals    Flowsheet Row Most Recent Value   Prenatal Assessment    Fetal Heart Rate 145   Fundal Height (cm) 34 cm   Movement Present   Presentation Vertex   Prenatal Vitals    Blood Pressure 110/70   Weight - Scale 69 4 kg (153 lb)   Urine Albumin/Glucose    Dilation/Effacement/Station    Vaginal Drainage    Edema            General: Well appearing, no distress  Respiratory: Unlabored breathing  Cardiovascular: Regular rate  Abdomen: Soft, gravid, nontender  Fundal Height: Appropriate for gestational age  Extremities: Warm and well perfused  Non tender

## 2022-06-20 ENCOUNTER — ROUTINE PRENATAL (OUTPATIENT)
Dept: OBGYN CLINIC | Facility: CLINIC | Age: 37
End: 2022-06-20
Payer: COMMERCIAL

## 2022-06-20 VITALS
HEIGHT: 61 IN | BODY MASS INDEX: 29.45 KG/M2 | SYSTOLIC BLOOD PRESSURE: 110 MMHG | WEIGHT: 156 LBS | DIASTOLIC BLOOD PRESSURE: 70 MMHG

## 2022-06-20 DIAGNOSIS — Z3A.36 36 WEEKS GESTATION OF PREGNANCY: Primary | ICD-10-CM

## 2022-06-20 DIAGNOSIS — O09.523 MULTIGRAVIDA OF ADVANCED MATERNAL AGE IN THIRD TRIMESTER: ICD-10-CM

## 2022-06-20 DIAGNOSIS — Z28.39 RUBELLA NON-IMMUNE STATUS, ANTEPARTUM: ICD-10-CM

## 2022-06-20 DIAGNOSIS — O09.899 RUBELLA NON-IMMUNE STATUS, ANTEPARTUM: ICD-10-CM

## 2022-06-20 DIAGNOSIS — Z36.85 ENCOUNTER FOR ANTENATAL SCREENING FOR STREPTOCOCCUS B: ICD-10-CM

## 2022-06-20 LAB
EXTERNAL GROUP B STREP ANTIGEN: NEGATIVE
SL AMB  POCT GLUCOSE, UA: NORMAL
SL AMB POCT URINE PROTEIN: NORMAL

## 2022-06-20 PROCEDURE — 81002 URINALYSIS NONAUTO W/O SCOPE: CPT | Performed by: OBSTETRICS & GYNECOLOGY

## 2022-06-20 PROCEDURE — PNV: Performed by: OBSTETRICS & GYNECOLOGY

## 2022-06-20 NOTE — PROGRESS NOTES
Routine Prenatal Visit  [address] [facility], [address], [facility], [address]    Assessment/Plan:  Warner Baires is a 40y o  year old  at 36w3d who presents for routine prenatal visit  1  36 weeks gestation of pregnancy  -     POCT urine dip    2  Encounter for  screening for Streptococcus B  -     Strep B DNA probe, amplification    3  Multigravida of advanced maternal age in third trimester    4  Rubella non-immune status, antepartum        Next OB Visit 1 weeks  Subjective:     CC: Prenatal care    Tai Glaaviz is a 40 y o   female who presents for routine prenatal care at 36w3d  Pregnancy ROS: no leakage of fluid, pelvic pain, or vaginal bleeding  nml fetal movement  The following portions of the patient's history were reviewed and updated as appropriate: allergies, current medications, past family history, past medical history, obstetric history, gynecologic history, past social history, past surgical history and problem list       Objective:  /70 (BP Location: Left arm, Patient Position: Sitting, Cuff Size: Standard)   Ht 5' 1" (1 549 m)   Wt 70 8 kg (156 lb)   LMP 10/02/2021 (Exact Date)   BMI 29 48 kg/m²   Pregravid Weight/BMI: 56 2 kg (124 lb) (BMI 23 44)  Current Weight: 70 8 kg (156 lb)   Total Weight Gain: 14 5 kg (32 lb)   Pre- Vitals    Flowsheet Row Most Recent Value   Prenatal Assessment    Prenatal Vitals    Blood Pressure 110/70   Weight - Scale 70 8 kg (156 lb)   Urine Albumin/Glucose    Dilation/Effacement/Station    Vaginal Drainage    Edema            General: Well appearing, no distress  Abdomen: Soft, gravid, nontender  Fundal Height: Appropriate for gestational age  Extremities: Warm and well perfused  Non tender

## 2022-06-22 LAB — GP B STREP DNA SPEC QL NAA+PROBE: NEGATIVE

## 2022-06-28 ENCOUNTER — ROUTINE PRENATAL (OUTPATIENT)
Dept: OBGYN CLINIC | Facility: CLINIC | Age: 37
End: 2022-06-28

## 2022-06-28 VITALS — DIASTOLIC BLOOD PRESSURE: 62 MMHG | BODY MASS INDEX: 29.85 KG/M2 | WEIGHT: 158 LBS | SYSTOLIC BLOOD PRESSURE: 104 MMHG

## 2022-06-28 DIAGNOSIS — Z3A.37 37 WEEKS GESTATION OF PREGNANCY: Primary | ICD-10-CM

## 2022-06-28 DIAGNOSIS — O09.523 MULTIGRAVIDA OF ADVANCED MATERNAL AGE IN THIRD TRIMESTER: ICD-10-CM

## 2022-06-28 LAB
SL AMB  POCT GLUCOSE, UA: NORMAL
SL AMB POCT URINE PROTEIN: NORMAL

## 2022-06-28 PROCEDURE — PNV: Performed by: OBSTETRICS & GYNECOLOGY

## 2022-06-28 NOTE — PROGRESS NOTES
Routine Prenatal Visit  [address] [facility], [address], [facility], [address] [facility] [address] [facility]    Assessment/Plan:  Marcelino Isbell is a 40y o  year old  at 37w4d who presents for routine prenatal visit  1  37 weeks gestation of pregnancy  -     POCT urine dip    2  Multigravida of advanced maternal age in third trimester          Subjective:     CC: Prenatal care    Shay Suggs is a 40 y o   female who presents for routine prenatal care at 37w4d  Pregnancy ROS: no leakage of fluid, pelvic pain, or vaginal bleeding   + fetal movement  The following portions of the patient's history were reviewed and updated as appropriate: allergies, current medications, past family history, past medical history, obstetric history, gynecologic history, past social history, past surgical history and problem list       Objective:  /62   Wt 71 7 kg (158 lb)   LMP 10/02/2021 (Exact Date)   Breastfeeding No   BMI 29 85 kg/m²   Pregravid Weight/BMI: 56 2 kg (124 lb) (BMI 23 44)  Current Weight: 71 7 kg (158 lb)   Total Weight Gain: 15 4 kg (34 lb)   Pre-Jack Vitals    Flowsheet Row Most Recent Value   Prenatal Assessment    Fetal Heart Rate 142-150   Fundal Height (cm) 37 cm   Movement Present   Presentation Vertex   Prenatal Vitals    Blood Pressure 104/62   Weight - Scale 71 7 kg (158 lb)   Urine Albumin/Glucose    Dilation/Effacement/Station    Vaginal Drainage    Draining Fluid No   Edema    LLE Edema None   RLE Edema None   Facial Edema None           General: Well appearing, no distress  Respiratory: Unlabored breathing  Cardiovascular: Regular rate  Abdomen: Soft, gravid, nontender  Fundal Height: Appropriate for gestational age  Extremities: Warm and well perfused  Non tender

## 2022-07-05 PROBLEM — Z3A.38 38 WEEKS GESTATION OF PREGNANCY: Status: ACTIVE | Noted: 2022-01-07

## 2022-07-06 ENCOUNTER — ROUTINE PRENATAL (OUTPATIENT)
Dept: OBGYN CLINIC | Facility: CLINIC | Age: 37
End: 2022-07-06

## 2022-07-06 VITALS
HEIGHT: 61 IN | BODY MASS INDEX: 30.21 KG/M2 | WEIGHT: 160 LBS | DIASTOLIC BLOOD PRESSURE: 72 MMHG | SYSTOLIC BLOOD PRESSURE: 110 MMHG

## 2022-07-06 DIAGNOSIS — O09.523 MULTIGRAVIDA OF ADVANCED MATERNAL AGE IN THIRD TRIMESTER: Primary | ICD-10-CM

## 2022-07-06 DIAGNOSIS — O09.899 RUBELLA NON-IMMUNE STATUS, ANTEPARTUM: ICD-10-CM

## 2022-07-06 DIAGNOSIS — Z3A.38 38 WEEKS GESTATION OF PREGNANCY: ICD-10-CM

## 2022-07-06 DIAGNOSIS — Z28.39 RUBELLA NON-IMMUNE STATUS, ANTEPARTUM: ICD-10-CM

## 2022-07-06 LAB
SL AMB  POCT GLUCOSE, UA: NORMAL
SL AMB POCT URINE PROTEIN: NORMAL

## 2022-07-06 PROCEDURE — PNV: Performed by: OBSTETRICS & GYNECOLOGY

## 2022-07-06 NOTE — PROGRESS NOTES
Routine Prenatal Visit  [address] [facility], [address], [facility], [address] [facility]    Assessment/Plan:  Inge Nascimento is a 40y o  year old  at 44w7d who presents for routine prenatal visit  3  Multigravida of advanced maternal age in third trimester    2  Rubella non-immune status, antepartum    3  38 weeks gestation of pregnancy  Assessment & Plan:  Reviewed with patient that any pregnant women can undergo an elective induction of labor at 39 weeks or later, depending on hospital availability  One study showed that induction of labor of first time mothers at 43 weeks compared to waiting until 41 weeks, decreased  rate by 3%, and decreased risk of developing high blood pressure in pregnancy  Alternatively induction of labor can often result in longer in-hospital stays overall, than allowing for spontaneous labor  This is especially true in patients who have an unfavorable cervix and require the additional step of cervical ripening prior to induction of labor - leading often to an additional 12-24 hours in the hospital prior to delivery, during which there is continuous fetal monitoring  For all patients who have not gone into labor spontaneously, we recommend induction of labor between 40 6-41 6 weeks, due to increase risk of stillbirth  Pregnancies past 40 6 weeks who are not in the process of induction, we recommend  testing with NST and LIZZETTE 2x/week  Discussed these pros and cons in relation to patient's desires for her delivery process, as well as the fact that she is a healthy patient with no other risk factors  She wishes to proceed with IOL - we scheduled for 2022, no cervical ripening necessary  Orders:  -     POCT urine dip      Next OB Visit 1 weeks  Subjective:     CC: Prenatal care    Emelia Cazares is a 40 y o  [facility] [employer] female who presents for routine prenatal care at 38w5d  Pregnancy ROS: no leakage of fluid, pelvic pain, or vaginal bleeding  normal fetal movement  The following portions of the patient's history were reviewed and updated as appropriate: allergies, current medications, past family history, past medical history, obstetric history, gynecologic history, past social history, past surgical history and problem list       Objective:  /72 (BP Location: Left arm, Patient Position: Sitting, Cuff Size: Standard)   Ht 5' 1" (1 549 m)   Wt 72 6 kg (160 lb)   LMP 10/02/2021 (Exact Date)   BMI 30 23 kg/m²   Pregravid Weight/BMI: 56 2 kg (124 lb) (BMI 23 44)  Current Weight: 72 6 kg (160 lb)   Total Weight Gain: 16 3 kg (36 lb)   Pre-Jack Vitals    Flowsheet Row Most Recent Value   Prenatal Assessment    Fetal Heart Rate 135   Fundal Height (cm) 38 cm   Movement Present   Presentation Vertex   Prenatal Vitals    Blood Pressure 110/72   Weight - Scale 72 6 kg (160 lb)   Urine Albumin/Glucose    Dilation/Effacement/Station    Cervical Dilation 3   Cervical Effacement 50   Fetal Station -3   Vaginal Drainage    Draining Fluid No   Edema    LLE Edema None   RLE Edema None           General: Well appearing, no distress  Abdomen: Soft, gravid, nontender  Fundal Height: Appropriate for gestational age  Extremities: Warm and well perfused  Non tender

## 2022-07-06 NOTE — ASSESSMENT & PLAN NOTE
Reviewed with patient that any pregnant women can undergo an elective induction of labor at 39 weeks or later, depending on hospital availability  One study showed that induction of labor of first time mothers at 43 weeks compared to waiting until 41 weeks, decreased  rate by 3%, and decreased risk of developing high blood pressure in pregnancy  Alternatively induction of labor can often result in longer in-hospital stays overall, than allowing for spontaneous labor  This is especially true in patients who have an unfavorable cervix and require the additional step of cervical ripening prior to induction of labor - leading often to an additional 12-24 hours in the hospital prior to delivery, during which there is continuous fetal monitoring  For all patients who have not gone into labor spontaneously, we recommend induction of labor between 40 6-41 6 weeks, due to increase risk of stillbirth  Pregnancies past 40 6 weeks who are not in the process of induction, we recommend  testing with NST and LIZZETTE 2x/week  Discussed these pros and cons in relation to patient's desires for her delivery process, as well as the fact that she is a healthy patient with no other risk factors  She wishes to proceed with IOL - we scheduled for 2022, no cervical ripening necessary

## 2022-07-13 ENCOUNTER — ROUTINE PRENATAL (OUTPATIENT)
Dept: OBGYN CLINIC | Facility: CLINIC | Age: 37
End: 2022-07-13

## 2022-07-13 VITALS
SYSTOLIC BLOOD PRESSURE: 109 MMHG | WEIGHT: 160.2 LBS | DIASTOLIC BLOOD PRESSURE: 80 MMHG | HEIGHT: 61 IN | BODY MASS INDEX: 30.25 KG/M2

## 2022-07-13 DIAGNOSIS — Z3A.39 39 WEEKS GESTATION OF PREGNANCY: Primary | ICD-10-CM

## 2022-07-13 LAB
SL AMB  POCT GLUCOSE, UA: NEGATIVE
SL AMB POCT URINE PROTEIN: NEGATIVE

## 2022-07-13 PROCEDURE — PNV: Performed by: STUDENT IN AN ORGANIZED HEALTH CARE EDUCATION/TRAINING PROGRAM

## 2022-07-13 NOTE — ASSESSMENT & PLAN NOTE
- Labor/Bleeding/ROM precautions given  Kick counts reviewed  - GBS negative result reviewed  - Elective induction of labor is scheduled for tomorrow  - Problem list updated, results console reviewed and updated with pertinent prenatal labs  - PMH, PSH, medications reviewed and updated as needed  - Return to office for postpartum care

## 2022-07-13 NOTE — PROGRESS NOTES
Routine Prenatal Visit  [address] [facility], [address], [facility], [address] [facility]    Assessment/Plan:  Radha Sánchez is a 40y o  year old  at 39w5d who presents for routine prenatal visit  1  39 weeks gestation of pregnancy  Assessment & Plan:  - Labor/Bleeding/ROM precautions given  Kick counts reviewed  - GBS negative result reviewed  - Elective induction of labor is scheduled for tomorrow  - Problem list updated, results console reviewed and updated with pertinent prenatal labs  - PMH, PSH, medications reviewed and updated as needed  - Return to office for postpartum care  Orders:  -     POCT urine dip          Subjective:   Charlene Vargas is a 40 y o  [place] who presents for routine prenatal care at 39w5d  Complaints today: Typical discomforts of pregnancy  LOF: No; VB: No; Contractions: No; FM: Present and normal    Objective:  /80 (BP Location: Left arm, Patient Position: Sitting, Cuff Size: Standard)   Ht 5' 1" (1 549 m)   Wt 72 7 kg (160 lb 3 2 oz)   LMP 10/02/2021 (Exact Date)   BMI 30 27 kg/m²     General: Well appearing, no distress  Respiratory: Unlabored breathing  Cardiovascular: Regular rate  Abdomen: Soft, gravid, nontender  Extremities: Warm and well perfused  Non tender      Pregravid Weight/BMI: 56 2 kg (124 lb) (BMI 23 44)  Current Weight: 72 7 kg (160 lb 3 2 oz)   Total Weight Gain: 16 4 kg (36 lb 3 2 oz)     Pre-Jack Vitals    Flowsheet Row Most Recent Value   Prenatal Assessment    Fetal Heart Rate 145   Fundal Height (cm) 39 cm   Movement Present   Presentation Vertex   Prenatal Vitals    Blood Pressure 109/80   Weight - Scale 72 7 kg (160 lb 3 2 oz)   Urine Albumin/Glucose    Dilation/Effacement/Station    Vaginal Drainage    Draining Fluid No   Edema    LLE Edema None   RLE Edema None   Facial Edema None           Salty Noriega MD  2022 9:21 AM

## 2022-07-14 ENCOUNTER — HOSPITAL ENCOUNTER (INPATIENT)
Facility: HOSPITAL | Age: 37
LOS: 2 days | Discharge: HOME | End: 2022-07-16
Attending: STUDENT IN AN ORGANIZED HEALTH CARE EDUCATION/TRAINING PROGRAM | Admitting: STUDENT IN AN ORGANIZED HEALTH CARE EDUCATION/TRAINING PROGRAM
Payer: COMMERCIAL

## 2022-07-14 ENCOUNTER — ANESTHESIA (INPATIENT)
Dept: ANESTHESIOLOGY | Facility: HOSPITAL | Age: 37
End: 2022-07-14
Payer: COMMERCIAL

## 2022-07-14 ENCOUNTER — ANESTHESIA EVENT (INPATIENT)
Dept: ANESTHESIOLOGY | Facility: HOSPITAL | Age: 37
End: 2022-07-14
Payer: COMMERCIAL

## 2022-07-14 ENCOUNTER — HOSPITAL ENCOUNTER (OUTPATIENT)
Dept: LABOR AND DELIVERY | Facility: HOSPITAL | Age: 37
Discharge: HOME | End: 2022-07-14
Payer: COMMERCIAL

## 2022-07-14 DIAGNOSIS — Z34.90 ENCOUNTER FOR ELECTIVE INDUCTION OF LABOR: ICD-10-CM

## 2022-07-14 LAB
ABO GROUP BLD: NORMAL
ABO GROUP BLD: NORMAL
BLD GP AB SCN SERPL QL: NEGATIVE
ERYTHROCYTE [DISTWIDTH] IN BLOOD BY AUTOMATED COUNT: 13.6 % (ref 11.6–15.1)
HCT VFR BLD AUTO: 38.3 % (ref 34.8–46.1)
HGB BLD-MCNC: 12.8 G/DL (ref 11.5–15.4)
MCH RBC QN AUTO: 31.6 PG (ref 26.8–34.3)
MCHC RBC AUTO-ENTMCNC: 33.4 G/DL (ref 31.4–37.4)
MCV RBC AUTO: 95 FL (ref 82–98)
PLATELET # BLD AUTO: 174 THOUSANDS/UL (ref 149–390)
PMV BLD AUTO: 12.7 FL (ref 8.9–12.7)
RBC # BLD AUTO: 4.05 MILLION/UL (ref 3.81–5.12)
RH BLD: POSITIVE
RH BLD: POSITIVE
SPECIMEN EXPIRATION DATE: NORMAL
WBC # BLD AUTO: 10.94 THOUSAND/UL (ref 4.31–10.16)

## 2022-07-14 PROCEDURE — 86850 RBC ANTIBODY SCREEN: CPT | Performed by: STUDENT IN AN ORGANIZED HEALTH CARE EDUCATION/TRAINING PROGRAM

## 2022-07-14 PROCEDURE — NC001 PR NO CHARGE: Performed by: STUDENT IN AN ORGANIZED HEALTH CARE EDUCATION/TRAINING PROGRAM

## 2022-07-14 PROCEDURE — 86592 SYPHILIS TEST NON-TREP QUAL: CPT | Performed by: STUDENT IN AN ORGANIZED HEALTH CARE EDUCATION/TRAINING PROGRAM

## 2022-07-14 PROCEDURE — 86900 BLOOD TYPING SEROLOGIC ABO: CPT | Performed by: STUDENT IN AN ORGANIZED HEALTH CARE EDUCATION/TRAINING PROGRAM

## 2022-07-14 PROCEDURE — 85027 COMPLETE CBC AUTOMATED: CPT | Performed by: STUDENT IN AN ORGANIZED HEALTH CARE EDUCATION/TRAINING PROGRAM

## 2022-07-14 PROCEDURE — 86901 BLOOD TYPING SEROLOGIC RH(D): CPT | Performed by: STUDENT IN AN ORGANIZED HEALTH CARE EDUCATION/TRAINING PROGRAM

## 2022-07-14 RX ORDER — LIDOCAINE HYDROCHLORIDE AND EPINEPHRINE 15; 5 MG/ML; UG/ML
INJECTION, SOLUTION EPIDURAL AS NEEDED
Status: DISCONTINUED | OUTPATIENT
Start: 2022-07-14 | End: 2022-07-15 | Stop reason: HOSPADM

## 2022-07-14 RX ORDER — OXYTOCIN/RINGER'S LACTATE 30/500 ML
1-30 PLASTIC BAG, INJECTION (ML) INTRAVENOUS
Status: DISCONTINUED | OUTPATIENT
Start: 2022-07-14 | End: 2022-07-15

## 2022-07-14 RX ORDER — ONDANSETRON 2 MG/ML
4 INJECTION INTRAMUSCULAR; INTRAVENOUS EVERY 6 HOURS PRN
Status: DISCONTINUED | OUTPATIENT
Start: 2022-07-14 | End: 2022-07-15

## 2022-07-14 RX ORDER — LIDOCAINE HYDROCHLORIDE 10 MG/ML
INJECTION, SOLUTION EPIDURAL; INFILTRATION; INTRACAUDAL; PERINEURAL AS NEEDED
Status: DISCONTINUED | OUTPATIENT
Start: 2022-07-14 | End: 2022-07-15 | Stop reason: HOSPADM

## 2022-07-14 RX ORDER — BUTORPHANOL TARTRATE 1 MG/ML
1 INJECTION, SOLUTION INTRAMUSCULAR; INTRAVENOUS
Status: DISCONTINUED | OUTPATIENT
Start: 2022-07-14 | End: 2022-07-15

## 2022-07-14 RX ORDER — SODIUM CHLORIDE, SODIUM LACTATE, POTASSIUM CHLORIDE, CALCIUM CHLORIDE 600; 310; 30; 20 MG/100ML; MG/100ML; MG/100ML; MG/100ML
125 INJECTION, SOLUTION INTRAVENOUS CONTINUOUS
Status: DISCONTINUED | OUTPATIENT
Start: 2022-07-14 | End: 2022-07-15

## 2022-07-14 RX ADMIN — SODIUM CHLORIDE, SODIUM LACTATE, POTASSIUM CHLORIDE, AND CALCIUM CHLORIDE 999 ML/HR: .6; .31; .03; .02 INJECTION, SOLUTION INTRAVENOUS at 20:10

## 2022-07-14 RX ADMIN — LIDOCAINE HYDROCHLORIDE AND EPINEPHRINE 3 ML: 15; 5 INJECTION, SOLUTION EPIDURAL at 20:01

## 2022-07-14 RX ADMIN — ROPIVACAINE HYDROCHLORIDE: 2 INJECTION, SOLUTION EPIDURAL; INFILTRATION at 20:13

## 2022-07-14 RX ADMIN — Medication 2 MILLI-UNITS/MIN: at 16:37

## 2022-07-14 RX ADMIN — LIDOCAINE HYDROCHLORIDE 3 ML: 10 INJECTION, SOLUTION EPIDURAL; INFILTRATION; INTRACAUDAL; PERINEURAL at 19:53

## 2022-07-14 RX ADMIN — SODIUM CHLORIDE, SODIUM LACTATE, POTASSIUM CHLORIDE, AND CALCIUM CHLORIDE 125 ML/HR: .6; .31; .03; .02 INJECTION, SOLUTION INTRAVENOUS at 23:10

## 2022-07-14 RX ADMIN — SODIUM CHLORIDE, SODIUM LACTATE, POTASSIUM CHLORIDE, AND CALCIUM CHLORIDE 125 ML/HR: .6; .31; .03; .02 INJECTION, SOLUTION INTRAVENOUS at 16:38

## 2022-07-14 RX ADMIN — ONDANSETRON 4 MG: 2 INJECTION INTRAMUSCULAR; INTRAVENOUS at 23:50

## 2022-07-14 NOTE — H&P
History & Physical - OB/GYN   Leatha Aranda 40 y o  female MRN: [id number]  Unit/Bed#: LD TRIAGE 2- Encounter: [id number]    Assessment:  40 y o   at 39w6d admitted for scheduled elective induction of labor    Plan:  1  Admit to L&D  2  Place IV, initiate IV fluids  3  Labs: CBC, RPR, type and screen  4  Labor management: start pitocin  5  Analgesia/Epidural PRN    Rubella non-immune status, antepartum  - Will administer MMR postpartum    _____________________    Chief complaint: Induction of labor    She is a patient of [address]  HPI: Leatha Aranda is 40 y o   at 39w6d presenting for scheduled elective induction of labor    Contractions:  no  Fetal movement:  yes  Vaginal bleeding:   no  Leaking of fluid:  no      Pregnancy Complications:  B2D2  FAY:7/15/2022 Problems (from 12/10/21 to present)     Problem Noted Resolved    Encounter for elective induction of labor 2022 by Linda Roberts MD No    39 weeks gestation of pregnancy 2022 by Aj Irene MD No    Rubella non-immune status, antepartum 2022 by Linda Roberts MD No    Overview Signed 2022 12:51 PM by Linda Roberts MD     Needs MMR postpartum           Multigravida of advanced maternal age in third trimester 12/10/2021 by Dagoberto Kaplan MD No    Overview Addendum 2022  8:56 AM by Anastacia Damon MD     cfDNA normal    Adequate growth @ 32 week U/S           Previous Version    9 weeks gestation of pregnancy 12/10/2021 by Dagoberto Kaplan MD 2022 by Aj Irene MD            PMH:  Past Medical History:   Diagnosis Date    GERD (gastroesophageal reflux disease)     managed through diet    Papanicolaou smear 2019       PSH:  Past Surgical History:   Procedure Laterality Date    WISDOM TOOTH EXTRACTION         Social Hx:  Social History     Tobacco Use    Smoking status: Never Smoker    Smokeless tobacco: Never Used   Vaping Use    Vaping Use: Never used   Substance Use Topics    Alcohol use: Not Currently     Comment: 1 or less per month    Drug use: Never     Comment: No use        OB Hx:  OB History    Para Term  AB Living   2 1 1 0 0 1   SAB IAB Ectopic Multiple Live Births   0 0 0 0 0      # Outcome Date GA Lbr Jd/2nd Weight Sex Delivery Anes PTL Lv   2 Current            1 Term 19 40w5d  3430 g (7 lb 9 oz) M Vag-Forceps EPI N       Birth Comments: FAVD, second degreee laceration      Complications: Dysfunctional Labor      Obstetric Comments   Menarche age 15       Meds:  No current facility-administered medications on file prior to encounter  Current Outpatient Medications on File Prior to Encounter   Medication Sig Dispense Refill    Prenatal MV-Min-Fe Fum-FA-DHA (PRENATAL+DHA PO) Take by mouth         Allergies:  No Known Allergies    Labs:  ABO Grouping   Date Value Ref Range Status   2021 O  Final      Rh Type   Date Value Ref Range Status   2021 Positive  Final     Comment:     Please note: Prior records for this patient's ABO / Rh type are not  available for additional verification  Rh Type   Date Value Ref Range Status   2021 Positive  Final     Comment:     Please note: Prior records for this patient's ABO / Rh type are not  available for additional verification  HIV-1/HIV-2 AB   Date Value Ref Range Status   2021 Non-Reactive  Final     Hepatitis B Surface Ag   Date Value Ref Range Status   2021 Negative  Final     HEP C AB   Date Value Ref Range Status   2021 <0 1 0 0 - 0 9 s/co ratio Final     Comment:                                       Negative:     < 0 8                               Indeterminate: 0 8 - 0 9                                    Positive:     > 0 9   The CDC recommends that a positive HCV antibody result   be followed up with a HCV Nucleic Acid Amplification   test (880540)         RPR   Date Value Ref Range Status   2022 Non Reactive Non Reactive Final     No results found for: RUBELLAIGGQT  Glucose   Date Value Ref Range Status   2022 85 65 - 139 mg/dL Final     Comment:     According to ADA, a glucose threshold of >139 mg/dL after 50-gram  load identifies approximately 80% of women with gestational  diabetes mellitus, while the sensitivity is further increased to  approximately 90% by a threshold of >129 mg/dL  No results found for: [id number]  Strep Grp B SUNI   Date Value Ref Range Status   2022 Negative Negative Final     Comment:     Centers for Disease Control and Prevention (CDC) and American Congress  of Obstetricians and Gynecologists (ACOG) guidelines for prevention of   group B streptococcal (GBS) disease specify co-collection of  a vaginal and rectal swab specimen to maximize sensitivity of GBS  detection  Per the CDC and ACOG, swabbing both the lower vagina and  rectum substantially increases the yield of detection compared with  sampling the vagina alone  Penicillin G, ampicillin, or cefazolin are indicated for intrapartum  prophylaxis of  GBS colonization  Reflex susceptibility  testing should be performed prior to use of clindamycin only on GBS  isolates from penicillin-allergic women who are considered a high risk  for anaphylaxis  Treatment with vancomycin without additional testing  is warranted if resistance to clindamycin is noted           Physical Exam:  /88   Pulse 94   Temp (!) 97 2 °F (36 2 °C) (Temporal)   Resp 16   Ht 5' 1" (1 549 m)   Wt 72 6 kg (160 lb)   LMP 10/02/2021 (Exact Date)   BMI 30 23 kg/m²     Gen: alert, no acute distress  Cardiac: regular rate  Resp: unlabored breathing  Abdomen: gravid, non-tender, non-distended  Extremities: non-tender, no edema    Estimated Fetal Weight: 7 0 lbs  Presentation: cephalic, confirmed via u/s    SVE: Cervical Dilation: 2-3  Cervical Effacement: 60  Cervical Consistency: Soft  Fetal Station: -2  Presentation: Vertex  Method: Manual  OB Examiner: Evgeny    Pelvis assessed and felt to be adequate    FHT:  Baseline Rate: 135 bpm  Variability: Moderate 6-25 bpm  Accelerations: 15 x 15 or greater, At variable times  Decelerations: None  TOCO:   Contraction Frequency (minutes): irritability  Contraction Quality: Mild    Membranes: Intact      Ngoc Garcia MD  7/14/2022 4:21 PM

## 2022-07-14 NOTE — PLAN OF CARE
Problem: BIRTH - VAGINAL/ SECTION  Goal: Fetal and maternal status remain reassuring during the birth process  Description: INTERVENTIONS:  - Monitor vital signs  - Monitor fetal heart rate  - Monitor uterine activity  - Monitor labor progression (vaginal delivery)  - DVT prophylaxis  - Antibiotic prophylaxis  Outcome: Progressing  Goal: Emotionally satisfying birthing experience for mother/fetus  Description: Interventions:  - Assess, plan, implement and evaluate the nursing care given to the patient in labor  - Advocate the philosophy that each childbirth experience is a unique experience and support the family's chosen level of involvement and control during the labor process   - Actively participate in both the patient's and family's teaching of the birth process  - Consider cultural, Rastafarian and age-specific factors and plan care for the patient in labor  Outcome: Progressing     Problem: PAIN - ADULT  Goal: Verbalizes/displays adequate comfort level or baseline comfort level  Description: Interventions:  - Encourage patient to monitor pain and request assistance  - Assess pain using appropriate pain scale  - Administer analgesics based on type and severity of pain and evaluate response  - Implement non-pharmacological measures as appropriate and evaluate response  - Consider cultural and social influences on pain and pain management  - Notify physician/advanced practitioner if interventions unsuccessful or patient reports new pain  Outcome: Progressing     Problem: INFECTION - ADULT  Goal: Absence or prevention of progression during hospitalization  Description: INTERVENTIONS:  - Assess and monitor for signs and symptoms of infection  - Monitor lab/diagnostic results  - Monitor all insertion sites, i e  indwelling lines, tubes, and drains  - Monitor endotracheal if appropriate and nasal secretions for changes in amount and color  - Hallie appropriate cooling/warming therapies per order  - Administer medications as ordered  - Instruct and encourage patient and family to use good hand hygiene technique  - Identify and instruct in appropriate isolation precautions for identified infection/condition  Outcome: Progressing  Goal: Absence of fever/infection during neutropenic period  Description: INTERVENTIONS:  - Monitor WBC    Outcome: Progressing     Problem: SAFETY ADULT  Goal: Patient will remain free of falls  Description: INTERVENTIONS:  - Educate patient/family on patient safety including physical limitations  - Instruct patient to call for assistance with activity   - Consult OT/PT to assist with strengthening/mobility   - Keep Call bell within reach  - Keep bed low and locked with side rails adjusted as appropriate  - Keep care items and personal belongings within reach  - Initiate and maintain comfort rounds  - Make Fall Risk Sign visible to staff  - Apply yellow socks and bracelet for high fall risk patients  - Consider moving patient to room near nurses station  Outcome: Progressing  Goal: Maintain or return to baseline ADL function  Description: INTERVENTIONS:  -  Assess patient's ability to carry out ADLs; assess patient's baseline for ADL function and identify physical deficits which impact ability to perform ADLs (bathing, care of mouth/teeth, toileting, grooming, dressing, etc )  - Assess/evaluate cause of self-care deficits   - Assess range of motion  - Assess patient's mobility; develop plan if impaired  - Assess patient's need for assistive devices and provide as appropriate  - Encourage maximum independence but intervene and supervise when necessary  - Involve family in performance of ADLs  - Assess for home care needs following discharge   - Consider OT consult to assist with ADL evaluation and planning for discharge  - Provide patient education as appropriate  Outcome: Progressing  Goal: Maintains/Returns to pre admission functional level  Description: INTERVENTIONS:  - Perform BMAT or MOVE assessment daily    - Set and communicate daily mobility goal to care team and patient/family/caregiver  - Collaborate with rehabilitation services on mobility goals if consulted  - Out of bed for toileting  - Record patient progress and toleration of activity level   Outcome: Progressing     Problem: Knowledge Deficit  Goal: Patient/family/caregiver demonstrates understanding of disease process, treatment plan, medications, and discharge instructions  Description: Complete learning assessment and assess knowledge base  Interventions:  - Provide teaching at level of understanding  - Provide teaching via preferred learning methods  Outcome: Progressing  Goal: Verbalizes understanding of labor plan  Description: Assess patient/family/caregiver's baseline knowledge level and ability to understand information  Provide education via patient/family/caregiver's preferred learning method at appropriate level of understanding  1  Provide teaching at level of understanding  2  Provide teaching via preferred learning method(s)    Outcome: Progressing     Problem: DISCHARGE PLANNING  Goal: Discharge to home or other facility with appropriate resources  Description: INTERVENTIONS:  - Identify barriers to discharge w/patient and caregiver  - Arrange for needed discharge resources and transportation as appropriate  - Identify discharge learning needs (meds, wound care, etc )  - Arrange for interpretive services to assist at discharge as needed  - Refer to Case Management Department for coordinating discharge planning if the patient needs post-hospital services based on physician/advanced practitioner order or complex needs related to functional status, cognitive ability, or social support system  Outcome: Progressing     Problem: DISCHARGE PLANNING  Goal: Discharge to home or other facility with appropriate resources  Description: INTERVENTIONS:  - Identify barriers to discharge w/patient and caregiver  - Arrange for needed discharge resources and transportation as appropriate  - Identify discharge learning needs (meds, wound care, etc )  - Arrange for interpretive services to assist at discharge as needed  - Refer to Case Management Department for coordinating discharge planning if the patient needs post-hospital services based on physician/advanced practitioner order or complex needs related to functional status, cognitive ability, or social support system  Outcome: Progressing     Problem: Labor & Delivery  Goal: Manages discomfort  Description: Assess and monitor for signs and symptoms of discomfort  Assess patient's pain level regularly and per hospital policy  Administer medications as ordered  Support use of nonpharmacological methods to help control pain such as distraction, imagery, relaxation, and application of heat and cold  Collaborate with interdisciplinary team and patient to determine appropriate pain management plan  1  Include patient in decisions related to comfort  2  Offer non-pharmacological pain management interventions  3  Report ineffective pain management to physician  Outcome: Progressing  Goal: Patient vital signs are stable  Description: 1  Assess vital signs - vaginal delivery    Outcome: Progressing

## 2022-07-14 NOTE — PROGRESS NOTES
07/14/22 1622   Oxytocin Safety Bundle   Oxytocin Initiation Type Induction   Indications for Induction Elective (>39 wks GA only)   Induction/Augmentation Consent Completed Yes   Estimated Fetal Weight 7 0   Baseline Rate 135 bpm   FHR Category Category I   Contraction Frequency (minutes) irritability   Contraction Quality Mild   Tachysystole No   Schulte Score   Cervical Consistency 2   Cervical Dilation 2 5   Cervical Effacement 60   Cervical Position 1   Fetal Station -2   Schulte Score 7   Oxytocin Safety Bundle Completion   Oxytocin Safety Bundle complete? Yes   Safe to proceed?  Yes     Saman Muir MD  7/14/2022 4:22 PM

## 2022-07-14 NOTE — OB LABOR/OXYTOCIN SAFETY PROGRESS
Oxytocin Safety Progress Check Note - Maki Mon 40 y o  female MRN: [id number]    Unit/Bed#: -01 Encounter: [id number]    Dose (leslie-units/min) Oxytocin: 8 leslie-units/min  Contraction Frequency (minutes): 2-3  Contraction Quality: Mild  Tachysystole: No   Cervical Dilation: 3        Cervical Effacement: 60  Fetal Station: -2  Baseline Rate: 135 bpm  Fetal Heart Rate: 180 BPM  FHR Category: Category I             Vital Signs:   Vitals:    22 1916   BP: 132/74   Pulse: 65   Resp: 16   Temp: 97 7 °F (36 5 °C)       Notes/comments:   - Fetal and maternal status reassuring    - AROM performed on exam at 19:12, clear fluid noted     - Continue pitocin titration  - Analgesia PRN  - Anticipate     Darus Alpers, MD 2022 7:18 PM

## 2022-07-14 NOTE — ANESTHESIA PREPROCEDURE EVALUATION
Procedure:  LABOR ANALGESIA    Relevant Problems   ANESTHESIA (within normal limits)      Labs:   Results from last 7 days   Lab Units 07/14/22  1602   WBC Thousand/uL 10 94*   HEMOGLOBIN g/dL 12 8   HEMATOCRIT % 38 3   PLATELETS Thousands/uL 174       Type and Screen:  Results from last 7 days   Lab Units 07/14/22  1638 07/14/22  1602   ABO GROUPING  O O   RH FACTOR  Positive Positive   ANTIBODY SCREEN   --  Negative   SPECIMEN EXPIRATION DATE   --  [id number]           Physical Exam    Airway    Mallampati score: I  TM Distance: >3 FB  Neck ROM: full     Dental   No notable dental hx     Cardiovascular  Cardiovascular exam normal    Pulmonary  Pulmonary exam normal     Other Findings        Anesthesia Plan  ASA Score- 1     Anesthesia Type- epidural with ASA Monitors  Additional Monitors:   Airway Plan:     Comment: Patient is requesting epidural for labor  Patient seen and examined  The risks/benefits of Epidural anesthesia discussed including risk of PDPH, partial or failed block, and rare emergencies including infection, nerve injury and total spinal anesthesia  Anesthetic plan for potential c/s in event of emergency reviewed with patient          Plan Factors-    Chart reviewed  Existing labs reviewed  Patient summary reviewed  Induction-     Postoperative Plan-     Informed Consent- Anesthetic plan and risks discussed with patient  I personally reviewed this patient with the CRNA  Discussed and agreed on the Anesthesia Plan with the CRNA  Lillie Hernandez

## 2022-07-14 NOTE — PLAN OF CARE

## 2022-07-15 LAB
BASE EXCESS BLDCOA CALC-SCNC: -7.3 MMOL/L (ref 3–11)
BASE EXCESS BLDCOV CALC-SCNC: 0.1 MMOL/L (ref 1–9)
ERYTHROCYTE [DISTWIDTH] IN BLOOD BY AUTOMATED COUNT: 13.7 % (ref 11.6–15.1)
HCO3 BLDCOA-SCNC: 24.3 MMOL/L (ref 17.3–27.3)
HCO3 BLDCOV-SCNC: 25.8 MMOL/L (ref 12.2–28.6)
HCT VFR BLD AUTO: 36.6 % (ref 34.8–46.1)
HGB BLD-MCNC: 12 G/DL (ref 11.5–15.4)
MCH RBC QN AUTO: 31.3 PG (ref 26.8–34.3)
MCHC RBC AUTO-ENTMCNC: 32.8 G/DL (ref 31.4–37.4)
MCV RBC AUTO: 96 FL (ref 82–98)
O2 CT VFR BLDCOA CALC: 9.1 ML/DL
OXYHGB MFR BLDCOA: 38.3 %
OXYHGB MFR BLDCOV: 80.7 %
PCO2 BLDCOA: 77.2 MM[HG] (ref 30–60)
PCO2 BLDCOV: 45.2 MM HG (ref 27–43)
PH BLDCOA: 7.12 [PH] (ref 7.23–7.43)
PH BLDCOV: 7.38 [PH] (ref 7.19–7.49)
PLATELET # BLD AUTO: 146 THOUSANDS/UL (ref 149–390)
PMV BLD AUTO: 12 FL (ref 8.9–12.7)
PO2 BLDCOA: 22.5 MM HG (ref 5–25)
PO2 BLDCOV: 37.5 MM HG (ref 15–45)
RBC # BLD AUTO: 3.83 MILLION/UL (ref 3.81–5.12)
RPR SER QL: NORMAL
SAO2 % BLDCOV: 21.2 ML/DL
WBC # BLD AUTO: 15.71 THOUSAND/UL (ref 4.31–10.16)

## 2022-07-15 PROCEDURE — 82805 BLOOD GASES W/O2 SATURATION: CPT | Performed by: STUDENT IN AN ORGANIZED HEALTH CARE EDUCATION/TRAINING PROGRAM

## 2022-07-15 PROCEDURE — 0KQM0ZZ REPAIR PERINEUM MUSCLE, OPEN APPROACH: ICD-10-PCS | Performed by: STUDENT IN AN ORGANIZED HEALTH CARE EDUCATION/TRAINING PROGRAM

## 2022-07-15 PROCEDURE — 4A1HXCZ MONITORING OF PRODUCTS OF CONCEPTION, CARDIAC RATE, EXTERNAL APPROACH: ICD-10-PCS | Performed by: STUDENT IN AN ORGANIZED HEALTH CARE EDUCATION/TRAINING PROGRAM

## 2022-07-15 PROCEDURE — 10907ZC DRAINAGE OF AMNIOTIC FLUID, THERAPEUTIC FROM PRODUCTS OF CONCEPTION, VIA NATURAL OR ARTIFICIAL OPENING: ICD-10-PCS | Performed by: STUDENT IN AN ORGANIZED HEALTH CARE EDUCATION/TRAINING PROGRAM

## 2022-07-15 PROCEDURE — 85027 COMPLETE CBC AUTOMATED: CPT | Performed by: STUDENT IN AN ORGANIZED HEALTH CARE EDUCATION/TRAINING PROGRAM

## 2022-07-15 PROCEDURE — 59400 OBSTETRICAL CARE: CPT | Performed by: STUDENT IN AN ORGANIZED HEALTH CARE EDUCATION/TRAINING PROGRAM

## 2022-07-15 PROCEDURE — 3E033VJ INTRODUCTION OF OTHER HORMONE INTO PERIPHERAL VEIN, PERCUTANEOUS APPROACH: ICD-10-PCS | Performed by: STUDENT IN AN ORGANIZED HEALTH CARE EDUCATION/TRAINING PROGRAM

## 2022-07-15 RX ORDER — DIAPER,BRIEF,INFANT-TODD,DISP
1 EACH MISCELLANEOUS DAILY PRN
Status: DISCONTINUED | OUTPATIENT
Start: 2022-07-15 | End: 2022-07-16 | Stop reason: HOSPADM

## 2022-07-15 RX ORDER — DOCUSATE SODIUM 100 MG/1
100 CAPSULE, LIQUID FILLED ORAL 2 TIMES DAILY
Status: DISCONTINUED | OUTPATIENT
Start: 2022-07-15 | End: 2022-07-16 | Stop reason: HOSPADM

## 2022-07-15 RX ORDER — IBUPROFEN 600 MG/1
600 TABLET ORAL EVERY 6 HOURS PRN
Status: DISCONTINUED | OUTPATIENT
Start: 2022-07-15 | End: 2022-07-16 | Stop reason: HOSPADM

## 2022-07-15 RX ORDER — ONDANSETRON 2 MG/ML
4 INJECTION INTRAMUSCULAR; INTRAVENOUS EVERY 8 HOURS PRN
Status: DISCONTINUED | OUTPATIENT
Start: 2022-07-15 | End: 2022-07-16 | Stop reason: HOSPADM

## 2022-07-15 RX ORDER — ACETAMINOPHEN 325 MG/1
650 TABLET ORAL EVERY 4 HOURS PRN
Status: DISCONTINUED | OUTPATIENT
Start: 2022-07-15 | End: 2022-07-16 | Stop reason: HOSPADM

## 2022-07-15 RX ORDER — DIPHENHYDRAMINE HCL 25 MG
25 TABLET ORAL EVERY 6 HOURS PRN
Status: DISCONTINUED | OUTPATIENT
Start: 2022-07-15 | End: 2022-07-16 | Stop reason: HOSPADM

## 2022-07-15 RX ORDER — CALCIUM CARBONATE 200(500)MG
1000 TABLET,CHEWABLE ORAL DAILY PRN
Status: DISCONTINUED | OUTPATIENT
Start: 2022-07-15 | End: 2022-07-16 | Stop reason: HOSPADM

## 2022-07-15 RX ADMIN — IBUPROFEN 600 MG: 600 TABLET ORAL at 03:03

## 2022-07-15 RX ADMIN — ACETAMINOPHEN 650 MG: 325 TABLET, FILM COATED ORAL at 14:01

## 2022-07-15 RX ADMIN — ACETAMINOPHEN 650 MG: 325 TABLET, FILM COATED ORAL at 07:42

## 2022-07-15 RX ADMIN — ACETAMINOPHEN 650 MG: 325 TABLET, FILM COATED ORAL at 18:20

## 2022-07-15 RX ADMIN — IBUPROFEN 600 MG: 600 TABLET ORAL at 09:31

## 2022-07-15 RX ADMIN — DOCUSATE SODIUM 100 MG: 100 CAPSULE, LIQUID FILLED ORAL at 18:18

## 2022-07-15 RX ADMIN — ACETAMINOPHEN 650 MG: 325 TABLET, FILM COATED ORAL at 21:56

## 2022-07-15 RX ADMIN — BENZOCAINE AND LEVOMENTHOL 1 APPLICATION: 200; 5 SPRAY TOPICAL at 02:25

## 2022-07-15 RX ADMIN — DOCUSATE SODIUM 100 MG: 100 CAPSULE, LIQUID FILLED ORAL at 07:41

## 2022-07-15 RX ADMIN — HYDROCORTISONE 1 APPLICATION: 1 CREAM TOPICAL at 02:25

## 2022-07-15 RX ADMIN — DOCUSATE SODIUM 100 MG: 100 CAPSULE, LIQUID FILLED ORAL at 14:04

## 2022-07-15 RX ADMIN — IBUPROFEN 600 MG: 600 TABLET ORAL at 15:54

## 2022-07-15 RX ADMIN — WITCH HAZEL 1 PAD: 500 SOLUTION RECTAL; TOPICAL at 02:25

## 2022-07-15 NOTE — ANESTHESIA POSTPROCEDURE EVALUATION
Post-Op Assessment Note    CV Status:  Stable    Pain management: adequate     Mental Status:  Alert and awake   Hydration Status:  Euvolemic   PONV Controlled:  Controlled   Airway Patency:  Patent      Post Op Vitals Reviewed: Yes      Staff: Anesthesiologist, CRNA     Post-op block assessment: catheter intact and no complications      No complications documented        Vitals:    07/15/22 0300   BP: 115/55   Pulse: 67   Resp: 16   Temp: 98 8 °F (37 1 °C)   SpO2: 97%

## 2022-07-15 NOTE — L&D DELIVERY NOTE
DELIVERY NOTE  Leatha Aranda 40 y o  female MRN: [id number]  Unit/Bed#: -01 Encounter: [id number]    Obstetrician:  Pat Kauffman MD    Pre-Delivery Diagnosis: Souza pregnancy in vertex presentation at 40w0d gestation  Post-Delivery Diagnosis: Same, delivered    Procedure: Spontaneous vaginal delivery    Delivery Date and Time:  7/15/2022 at 64:36    Umbilical Artery  Recent Labs     07/15/22  0041   PHCART 7 116*   BECART -7 3*       Umbilical Vein  Recent Labs     07/15/22  0041   PHCVEN 7 375   BECVEN 0 1*       Apgars: 7 at 1 minute, 8 at 5 minutes    Weight: Pending           Complications: None    Description of Procedure:  Patient was found to be completely dilated and +1 station  She pushed for 17 minutes to spontaneously deliver a liveborn female infant, "Juan M Gentleman," in right occiput anterior position through clear fluid at 00:35  The head and shoulders delivered easily, with the body easily delivering thereafter  A loose nuchal cord x 1 was noted and reduced  The infant was placed on maternal abdomen  Cord clamping was delayed for 30 seconds, after which the cord was doubly clamped and cut  Routine cord gases and cord blood were collected  Pitocin was started for active management of the 3rd stage  Placenta delivered spontaneously and was noted to have a centrally-inserted 3-vessel cord  The placenta was sent to for storage  The fundus was noted to be firm on exam  A thorough pelvic exam revealed a 2nd degree laceration, which was repaired with 2-0 and 3-0 polyglactin suture in typical fashion  Excellent hemostasis was noted, with total QBL of 70 mL  All needle, sponge, and instrument counts were noted to be correct  The patient tolerated the procedure well and was allowed to recover in labor and delivery room       Gene Rao MD  7/15/2022 1:02 AM

## 2022-07-15 NOTE — ANESTHESIA PROCEDURE NOTES
Epidural Block    Patient location during procedure: OB  Start time: 7/14/2022 8:00 PM  Reason for block: at surgeon's request and primary anesthetic  Staffing  Performed: CRNA   Anesthesiologist: Lizet Chadwick MD  Resident/CRNA: Lang Enriquez CRNA  Preanesthetic Checklist  Completed: patient identified, IV checked, risks and benefits discussed, surgical consent, monitors and equipment checked, pre-op evaluation and timeout performed  Epidural  Patient position: sitting  Prep: Betadine  Patient monitoring: frequent blood pressure checks and continuous pulse ox  Approach: midline  Location: lumbar  Injection technique: ZEE saline  Needle  Needle type: Tuohy   Needle gauge: 18 G  Catheter type: end hole  Catheter size: 20 G  Catheter at skin depth: 12 cm  Catheter securement method: stabilization device  Test dose: negative  Assessment  Number of attempts: 1negative aspiration for CSF, negative aspiration for heme and no paresthesia on injection  patient tolerated the procedure well with no immediate complications

## 2022-07-15 NOTE — PLAN OF CARE
Problem: BIRTH - VAGINAL/ SECTION  Goal: Fetal and maternal status remain reassuring during the birth process  Description: INTERVENTIONS:  - Monitor vital signs  - Monitor fetal heart rate  - Monitor uterine activity  - Monitor labor progression (vaginal delivery)  - DVT prophylaxis  - Antibiotic prophylaxis  7/15/2022 0345 by Khadra Douglass RN  Outcome: Completed  2022 by Khadra Douglass RN  Outcome: Progressing  Goal: Emotionally satisfying birthing experience for mother/fetus  Description: Interventions:  - Assess, plan, implement and evaluate the nursing care given to the patient in labor  - Advocate the philosophy that each childbirth experience is a unique experience and support the family's chosen level of involvement and control during the labor process   - Actively participate in both the patient's and family's teaching of the birth process  - Consider cultural, [employer] and age-specific factors and plan care for the patient in labor  7/15/2022 0345 by Khadra Douglass RN  Outcome: Completed  2022 by Khadra Douglass RN  Outcome: Progressing     Problem: POSTPARTUM  Goal: Experiences normal postpartum course  Description: INTERVENTIONS:  - Monitor maternal vital signs  - Assess uterine involution and lochia  Outcome: Progressing  Goal: Appropriate maternal -  bonding  Description: INTERVENTIONS:  - Identify family support  - Assess for appropriate maternal/infant bonding   -Encourage maternal/infant bonding opportunities  - Referral to  or  as needed  Outcome: Progressing  Goal: Establishment of infant feeding pattern  Description: INTERVENTIONS:  - Assess breast/bottle feeding  - Refer to lactation as needed  Outcome: Progressing  Goal: Incision(s), wounds(s) or drain site(s) healing without S/S of infection  Description: INTERVENTIONS  - Assess and document dressing, incision, wound bed, drain sites and surrounding tissue  - Provide patient and family education  Outcome: Progressing     Problem: PAIN - ADULT  Goal: Verbalizes/displays adequate comfort level or baseline comfort level  Description: Interventions:  - Encourage patient to monitor pain and request assistance  - Assess pain using appropriate pain scale  - Administer analgesics based on type and severity of pain and evaluate response  - Implement non-pharmacological measures as appropriate and evaluate response  - Consider cultural and social influences on pain and pain management  - Notify physician/advanced practitioner if interventions unsuccessful or patient reports new pain  7/15/2022 0345 by Linda Ireland RN  Outcome: Progressing  7/14/2022 1935 by Linda Ireland RN  Outcome: Progressing     Problem: INFECTION - ADULT  Goal: Absence or prevention of progression during hospitalization  Description: INTERVENTIONS:  - Assess and monitor for signs and symptoms of infection  - Monitor lab/diagnostic results  - Monitor all insertion sites, i e  indwelling lines, tubes, and drains  - Monitor endotracheal if appropriate and nasal secretions for changes in amount and color  - [place] appropriate cooling/warming therapies per order  - Administer medications as ordered  - Instruct and encourage patient and family to use good hand hygiene technique  - Identify and instruct in appropriate isolation precautions for identified infection/condition  7/15/2022 0345 by Linda Ireland RN  Outcome: Progressing  7/14/2022 1935 by Linda Ireland RN  Outcome: Progressing  Goal: Absence of fever/infection during neutropenic period  Description: INTERVENTIONS:  - Monitor WBC    7/15/2022 0345 by Linda Ireland RN  Outcome: Progressing  7/14/2022 1935 by Linda Ireland RN  Outcome: Progressing     Problem: SAFETY ADULT  Goal: Patient will remain free of falls  Description: INTERVENTIONS:  - Educate patient/family on patient safety including physical limitations  - Instruct patient to call for assistance with activity   - Consult OT/PT to assist with strengthening/mobility   - Keep Call bell within reach  - Keep bed low and locked with side rails adjusted as appropriate  - Keep care items and personal belongings within reach  - Initiate and maintain comfort rounds  - Make Fall Risk Sign visible to staff  - Apply yellow socks and bracelet for high fall risk patients  - Consider moving patient to room near nurses station  7/15/2022 0345 by Ruth Roblero RN  Outcome: Progressing  7/14/2022 1935 by Ruth Roblero RN  Outcome: Progressing  Goal: Maintain or return to baseline ADL function  Description: INTERVENTIONS:  -  Assess patient's ability to carry out ADLs; assess patient's baseline for ADL function and identify physical deficits which impact ability to perform ADLs (bathing, care of mouth/teeth, toileting, grooming, dressing, etc )  - Assess/evaluate cause of self-care deficits   - Assess range of motion  - Assess patient's mobility; develop plan if impaired  - Assess patient's need for assistive devices and provide as appropriate  - Encourage maximum independence but intervene and supervise when necessary  - Involve family in performance of ADLs  - Assess for home care needs following discharge   - Consider OT consult to assist with ADL evaluation and planning for discharge  - Provide patient education as appropriate  7/15/2022 0345 by Ruth Roblero RN  Outcome: Progressing  7/14/2022 1935 by Ruth Roblero RN  Outcome: Progressing  Goal: Maintains/Returns to pre admission functional level  Description: INTERVENTIONS:  - Perform BMAT or MOVE assessment daily    - Set and communicate daily mobility goal to care team and patient/family/caregiver     - Collaborate with rehabilitation services on mobility goals if consulted  - Out of bed for toileting  - Record patient progress and toleration of activity level   7/15/2022 0345 by Ruth Roblero RN  Outcome: Progressing  7/14/2022 1935 by Ruth Roblero RN  Outcome: Progressing     Problem: Knowledge Deficit  Goal: Patient/family/caregiver demonstrates understanding of disease process, treatment plan, medications, and discharge instructions  Description: Complete learning assessment and assess knowledge base  Interventions:  - Provide teaching at level of understanding  - Provide teaching via preferred learning methods  7/15/2022 0345 by Liana Stewart RN  Outcome: Completed  7/14/2022 1935 by Liana Stewart RN  Outcome: Progressing  Goal: Verbalizes understanding of labor plan  Description: Assess patient/family/caregiver's baseline knowledge level and ability to understand information  Provide education via patient/family/caregiver's preferred learning method at appropriate level of understanding  1  Provide teaching at level of understanding  2  Provide teaching via preferred learning method(s)  7/15/2022 0345 by Liana Stewart RN  Outcome: Completed  7/14/2022 1935 by Liana Stewart RN  Outcome: Progressing     Problem: DISCHARGE PLANNING  Goal: Discharge to home or other facility with appropriate resources  Description: INTERVENTIONS:  - Identify barriers to discharge w/patient and caregiver  - Arrange for needed discharge resources and transportation as appropriate  - Identify discharge learning needs (meds, wound care, etc )  - Arrange for interpretive services to assist at discharge as needed  - Refer to Case Management Department for coordinating discharge planning if the patient needs post-hospital services based on physician/advanced practitioner order or complex needs related to functional status, cognitive ability, or social support system  7/15/2022 0345 by Liana Stewart RN  Outcome: Progressing  7/14/2022 1935 by Liana Stewart RN  Outcome: Progressing     Problem: Labor & Delivery  Goal: Manages discomfort  Description: Assess and monitor for signs and symptoms of discomfort    Assess patient's pain level regularly and per hospital policy  Administer medications as ordered  Support use of nonpharmacological methods to help control pain such as distraction, imagery, relaxation, and application of heat and cold  Collaborate with interdisciplinary team and patient to determine appropriate pain management plan  1  Include patient in decisions related to comfort  2  Offer non-pharmacological pain management interventions  3  Report ineffective pain management to physician  7/15/2022 0345 by Katelyn Hu RN  Outcome: Completed  7/14/2022 1935 by Katelyn Hu RN  Outcome: Progressing  Goal: Patient vital signs are stable  Description: 1  Assess vital signs - vaginal delivery  7/15/2022 0345 by Katelyn Hu RN  Outcome: Completed  7/14/2022 1935 by Katelyn Hu RN  Outcome: Progressing     Problem: Nutrition/Hydration-ADULT  Goal: Nutrient/Hydration intake appropriate for improving, restoring or maintaining nutritional needs  Description: Monitor and assess patient's nutrition/hydration status for malnutrition  Collaborate with interdisciplinary team and initiate plan and interventions as ordered  Monitor patient's weight and dietary intake as ordered or per policy  Utilize nutrition screening tool and intervene as necessary  Determine patient's food preferences and provide high-protein, high-caloric foods as appropriate       INTERVENTIONS:  - Monitor oral intake, urinary output, labs, and treatment plans  - Assess nutrition and hydration status and recommend course of action  - Evaluate amount of meals eaten  - Assist patient with eating if necessary   - Allow adequate time for meals  - Recommend/ encourage appropriate diets, oral nutritional supplements, and vitamin/mineral supplements  - Order, calculate, and assess calorie counts as needed  - Recommend, monitor, and adjust tube feedings and TPN/PPN based on assessed needs  - Assess need for intravenous fluids  - Provide specific nutrition/hydration education as appropriate  - Include patient/family/caregiver in decisions related to nutrition  Outcome: Progressing

## 2022-07-15 NOTE — PLAN OF CARE
Problem: PAIN - ADULT  Goal: Verbalizes/displays adequate comfort level or baseline comfort level  Description: Interventions:  - Encourage patient to monitor pain and request assistance  - Assess pain using appropriate pain scale  - Administer analgesics based on type and severity of pain and evaluate response  - Implement non-pharmacological measures as appropriate and evaluate response  - Consider cultural and social influences on pain and pain management  - Notify physician/advanced practitioner if interventions unsuccessful or patient reports new pain  Outcome: Progressing     Problem: INFECTION - ADULT  Goal: Absence or prevention of progression during hospitalization  Description: INTERVENTIONS:  - Assess and monitor for signs and symptoms of infection  - Monitor lab/diagnostic results  - Monitor all insertion sites, i e  indwelling lines, tubes, and drains  - Monitor endotracheal if appropriate and nasal secretions for changes in amount and color  - [place] appropriate cooling/warming therapies per order  - Administer medications as ordered  - Instruct and encourage patient and family to use good hand hygiene technique  - Identify and instruct in appropriate isolation precautions for identified infection/condition  Outcome: Progressing  Goal: Absence of fever/infection during neutropenic period  Description: INTERVENTIONS:  - Monitor WBC    Outcome: Progressing     Problem: SAFETY ADULT  Goal: Patient will remain free of falls  Description: INTERVENTIONS:  - Educate patient/family on patient safety including physical limitations  - Instruct patient to call for assistance with activity   - Consult OT/PT to assist with strengthening/mobility   - Keep Call bell within reach  - Keep bed low and locked with side rails adjusted as appropriate  - Keep care items and personal belongings within reach  - Initiate and maintain comfort rounds  - Apply yellow socks and bracelet for high fall risk patients  - Consider moving patient to room near nurses station  Outcome: Progressing  Goal: Maintain or return to baseline ADL function  Description: INTERVENTIONS:  -  Assess patient's ability to carry out ADLs; assess patient's baseline for ADL function and identify physical deficits which impact ability to perform ADLs (bathing, care of mouth/teeth, toileting, grooming, dressing, etc )  - Assess/evaluate cause of self-care deficits   - Assess range of motion  - Assess patient's mobility; develop plan if impaired  - Assess patient's need for assistive devices and provide as appropriate  - Encourage maximum independence but intervene and supervise when necessary  - Involve family in performance of ADLs  - Assess for home care needs following discharge   - Consider OT consult to assist with ADL evaluation and planning for discharge  - Provide patient education as appropriate  Outcome: Progressing  Goal: Maintains/Returns to pre admission functional level  Description: INTERVENTIONS:  - Perform BMAT or MOVE assessment daily    - Set and communicate daily mobility goal to care team and patient/family/caregiver     - Collaborate with rehabilitation services on mobility goals if consulted  - Record patient progress and toleration of activity level   Outcome: Progressing     Problem: DISCHARGE PLANNING  Goal: Discharge to home or other facility with appropriate resources  Description: INTERVENTIONS:  - Identify barriers to discharge w/patient and caregiver  - Arrange for needed discharge resources and transportation as appropriate  - Identify discharge learning needs (meds, wound care, etc )  - Arrange for interpretive services to assist at discharge as needed  - Refer to Case Management Department for coordinating discharge planning if the patient needs post-hospital services based on physician/advanced practitioner order or complex needs related to functional status, cognitive ability, or social support system  Outcome: Progressing Problem: POSTPARTUM  Goal: Experiences normal postpartum course  Description: INTERVENTIONS:  - Monitor maternal vital signs  - Assess uterine involution and lochia  Outcome: Progressing  Goal: Appropriate maternal -  bonding  Description: INTERVENTIONS:  - Identify family support  - Assess for appropriate maternal/infant bonding   -Encourage maternal/infant bonding opportunities  - Referral to  or  as needed  Outcome: Progressing  Goal: Establishment of infant feeding pattern  Description: INTERVENTIONS:  - Assess breast/bottle feeding  - Refer to lactation as needed  Outcome: Progressing  Goal: Incision(s), wounds(s) or drain site(s) healing without S/S of infection  Description: INTERVENTIONS  - Assess and document dressing, incision, wound bed, drain sites and surrounding tissue  Outcome: Progressing     Problem: Nutrition/Hydration-ADULT  Goal: Nutrient/Hydration intake appropriate for improving, restoring or maintaining nutritional needs  Description: Monitor and assess patient's nutrition/hydration status for malnutrition  Collaborate with interdisciplinary team and initiate plan and interventions as ordered  Monitor patient's weight and dietary intake as ordered or per policy  Utilize nutrition screening tool and intervene as necessary  Determine patient's food preferences and provide high-protein, high-caloric foods as appropriate       INTERVENTIONS:  - Monitor oral intake, urinary output, labs, and treatment plans  - Assess nutrition and hydration status and recommend course of action  - Evaluate amount of meals eaten  - Assist patient with eating if necessary   - Allow adequate time for meals  - Recommend/ encourage appropriate diets, oral nutritional supplements, and vitamin/mineral supplements  - Order, calculate, and assess calorie counts as needed  - Recommend, monitor, and adjust tube feedings and TPN/PPN based on assessed needs  - Assess need for intravenous fluids  - Provide specific nutrition/hydration education as appropriate  - Include patient/family/caregiver in decisions related to nutrition  Outcome: Progressing

## 2022-07-15 NOTE — OB LABOR/OXYTOCIN SAFETY PROGRESS
Oxytocin Safety Progress Check Note - Clemente Pabon 40 y o  female MRN: [id number]    Unit/Bed#: -01 Encounter: [id number]    Dose (leslie-units/min) Oxytocin: 8 leslie-units/min  Contraction Frequency (minutes): 2-3  Contraction Quality: Moderate  Tachysystole: No     Baseline Rate: 145 bpm  Fetal Heart Rate: 170 BPM  FHR Category: Category I           Vital Signs:   Vitals:    22   BP: 113/60   Pulse: 72   Resp: 16   Temp: 97 8 °F (36 6 °C)   SpO2:        Notes/comments:   - Patient comfortable following placement of epidural  Fetal and maternal status reassuring  Continue pitocin titration  Anticipate           Naveed Tolbert MD 2022 8:58 PM

## 2022-07-16 ENCOUNTER — TELEPHONE (OUTPATIENT)
Dept: LABOR AND DELIVERY | Facility: HOSPITAL | Age: 37
End: 2022-07-16

## 2022-07-16 VITALS
TEMPERATURE: 97.7 F | SYSTOLIC BLOOD PRESSURE: 114 MMHG | WEIGHT: 160 LBS | RESPIRATION RATE: 18 BRPM | OXYGEN SATURATION: 97 % | DIASTOLIC BLOOD PRESSURE: 78 MMHG | HEIGHT: 61 IN | BODY MASS INDEX: 30.21 KG/M2 | HEART RATE: 55 BPM

## 2022-07-16 PROCEDURE — 90707 MMR VACCINE SC: CPT | Performed by: STUDENT IN AN ORGANIZED HEALTH CARE EDUCATION/TRAINING PROGRAM

## 2022-07-16 PROCEDURE — 99024 POSTOP FOLLOW-UP VISIT: CPT | Performed by: STUDENT IN AN ORGANIZED HEALTH CARE EDUCATION/TRAINING PROGRAM

## 2022-07-16 RX ORDER — ACETAMINOPHEN 325 MG/1
650 TABLET ORAL EVERY 4 HOURS PRN
Refills: 0
Start: 2022-07-16

## 2022-07-16 RX ORDER — DOCUSATE SODIUM 100 MG/1
100 CAPSULE, LIQUID FILLED ORAL 2 TIMES DAILY PRN
Refills: 0
Start: 2022-07-16

## 2022-07-16 RX ORDER — IBUPROFEN 200 MG
400 TABLET ORAL EVERY 4 HOURS PRN
Start: 2022-07-16

## 2022-07-16 RX ADMIN — DOCUSATE SODIUM 100 MG: 100 CAPSULE, LIQUID FILLED ORAL at 07:55

## 2022-07-16 RX ADMIN — IBUPROFEN 600 MG: 600 TABLET ORAL at 07:55

## 2022-07-16 RX ADMIN — MEASLES, MUMPS, AND RUBELLA VIRUS VACCINE LIVE 0.5 ML: 1000; 12500; 1000 INJECTION, POWDER, LYOPHILIZED, FOR SUSPENSION SUBCUTANEOUS at 11:38

## 2022-07-16 RX ADMIN — WITCH HAZEL 1 PAD: 500 SOLUTION RECTAL; TOPICAL at 11:39

## 2022-07-16 NOTE — DISCHARGE SUMMARY
Discharge Summary - OB/GYN   Miriam Antonio 40 y o  female MRN: [id number]  Unit/Bed#: -01 Encounter: [id number]    Admission Date: 2022     Discharge Date: 22    Principal Diagnosis: Encounter for full-term uncomplicated delivery [P69], [place] 86 Pregnancy at 40w0d    Secondary Diagnosis: N/A    Attending - Delivery: Bharti Ulrich MD         - Discharge: Helen Dixon MD    Procedures: Vaginal, Spontaneous , 1st degree laceration    Anesthesia: epidural    Complications: none apparent    Hospital Course:      Miriam Antonio is a 40 y o  [id number] at 40w0d who was admitted for elective induction of labor  She delivered a viable female  with weight of 2960 grams, apgars of 7 (1 min) and 8 (5 min)   was transferred to  nursery  Patient tolerated the procedure well and was transferred to recovery in stable condition  Her postpartum course was uncomplicated  Admission hemoglobin was 12 8, postpartum was 12 0  On day of discharge, she was ambulating and able to reasonably perform all ADLs  She was voiding and had appropriate bowel function  Pain was well controlled  She was discharged home on postpartum day #1 without complications  Patient was instructed to follow up with her OB as an outpatient and was given appropriate warnings to call provider if she develops signs of infection or uncontrolled pain  Condition at discharge: good     Discharge instructions/Information to patient and family:   See after visit summary for information provided to patient and family  Provisions for Follow-Up Care:  See after visit summary for information related to follow-up care and any pertinent home health orders  Disposition: See After Visit Summary for discharge disposition information  Planned Readmission: No    Discharge Medications: For a complete list of the patient's medications, please refer to her med rec      Helen Dixon, MD  7/16/2022 10:26 AM

## 2022-07-16 NOTE — PLAN OF CARE
Problem: PAIN - ADULT  Goal: Verbalizes/displays adequate comfort level or baseline comfort level  Description: Interventions:  - Encourage patient to monitor pain and request assistance  - Assess pain using appropriate pain scale  - Administer analgesics based on type and severity of pain and evaluate response  - Implement non-pharmacological measures as appropriate and evaluate response  - Consider cultural and social influences on pain and pain management  - Notify physician/advanced practitioner if interventions unsuccessful or patient reports new pain  Outcome: Progressing     Problem: INFECTION - ADULT  Goal: Absence or prevention of progression during hospitalization  Description: INTERVENTIONS:  - Assess and monitor for signs and symptoms of infection  - Monitor lab/diagnostic results  - Monitor all insertion sites, i e  indwelling lines, tubes, and drains  - Monitor endotracheal if appropriate and nasal secretions for changes in amount and color  - [place] appropriate cooling/warming therapies per order  - Administer medications as ordered  - Instruct and encourage patient and family to use good hand hygiene technique  - Identify and instruct in appropriate isolation precautions for identified infection/condition  Outcome: Progressing  Goal: Absence of fever/infection during neutropenic period  Description: INTERVENTIONS:  - Monitor WBC    Outcome: Progressing     Problem: SAFETY ADULT  Goal: Patient will remain free of falls  Description: INTERVENTIONS:  - Educate patient/family on patient safety including physical limitations  - Instruct patient to call for assistance with activity   - Consult OT/PT to assist with strengthening/mobility   - Keep Call bell within reach  - Keep bed low and locked with side rails adjusted as appropriate  - Keep care items and personal belongings within reach  - Initiate and maintain comfort rounds  - Make Fall Risk Sign visible to staff  - Apply yellow socks and bracelet for high fall risk patients  - Consider moving patient to room near nurses station  Outcome: Progressing  Goal: Maintain or return to baseline ADL function  Description: INTERVENTIONS:  -  Assess patient's ability to carry out ADLs; assess patient's baseline for ADL function and identify physical deficits which impact ability to perform ADLs (bathing, care of mouth/teeth, toileting, grooming, dressing, etc )  - Assess/evaluate cause of self-care deficits   - Assess range of motion  - Assess patient's mobility; develop plan if impaired  - Assess patient's need for assistive devices and provide as appropriate  - Encourage maximum independence but intervene and supervise when necessary  - Involve family in performance of ADLs  - Assess for home care needs following discharge   - Consider OT consult to assist with ADL evaluation and planning for discharge  - Provide patient education as appropriate  Outcome: Progressing  Goal: Maintains/Returns to pre admission functional level  Description: INTERVENTIONS:  - Perform BMAT or MOVE assessment daily    - Set and communicate daily mobility goal to care team and patient/family/caregiver     - Collaborate with rehabilitation services on mobility goals if consulted  - Out of bed for toileting  - Record patient progress and toleration of activity level   Outcome: Progressing     Problem: DISCHARGE PLANNING  Goal: Discharge to home or other facility with appropriate resources  Description: INTERVENTIONS:  - Identify barriers to discharge w/patient and caregiver  - Arrange for needed discharge resources and transportation as appropriate  - Identify discharge learning needs (meds, wound care, etc )  - Arrange for interpretive services to assist at discharge as needed  - Refer to Case Management Department for coordinating discharge planning if the patient needs post-hospital services based on physician/advanced practitioner order or complex needs related to functional status, cognitive ability, or social support system  Outcome: Progressing     Problem: POSTPARTUM  Goal: Experiences normal postpartum course  Description: INTERVENTIONS:  - Monitor maternal vital signs  - Assess uterine involution and lochia  Outcome: Progressing  Goal: Appropriate maternal -  bonding  Description: INTERVENTIONS:  - Identify family support  - Assess for appropriate maternal/infant bonding   -Encourage maternal/infant bonding opportunities  - Referral to  or  as needed  Outcome: Progressing  Goal: Establishment of infant feeding pattern  Description: INTERVENTIONS:  - Assess breast/bottle feeding  - Refer to lactation as needed  Outcome: Progressing  Goal: Incision(s), wounds(s) or drain site(s) healing without S/S of infection  Description: INTERVENTIONS  - Assess and document dressing, incision, wound bed, drain sites and surrounding tissue  - Provide patient and family education  Outcome: Progressing     Problem: Nutrition/Hydration-ADULT  Goal: Nutrient/Hydration intake appropriate for improving, restoring or maintaining nutritional needs  Description: Monitor and assess patient's nutrition/hydration status for malnutrition  Collaborate with interdisciplinary team and initiate plan and interventions as ordered  Monitor patient's weight and dietary intake as ordered or per policy  Utilize nutrition screening tool and intervene as necessary  Determine patient's food preferences and provide high-protein, high-caloric foods as appropriate       INTERVENTIONS:  - Monitor oral intake, urinary output, labs, and treatment plans  - Assess nutrition and hydration status and recommend course of action  - Evaluate amount of meals eaten  - Assist patient with eating if necessary   - Allow adequate time for meals  - Recommend/ encourage appropriate diets, oral nutritional supplements, and vitamin/mineral supplements  - Order, calculate, and assess calorie counts as needed  - Recommend, monitor, and adjust tube feedings and TPN/PPN based on assessed needs  - Assess need for intravenous fluids  - Provide specific nutrition/hydration education as appropriate  - Include patient/family/caregiver in decisions related to nutrition  Outcome: Progressing

## 2022-07-16 NOTE — PROGRESS NOTES
Progress Note - OB/GYN  Post-Partum Physician Note   Jessika Dejesus 40 y o  female MRN: [id number]  Unit/Bed#:  204-01 Encounter: [id number]    Assessment:  40y o  year-old , postpartum day #1 s/p normal spontaneous vaginal delivery    Clinically and hemodynamically stable  Meeting all postpartum milestones      Plan:   Continue routine postpartum care  Encourage ambulation  Discharge to home today    Rubella non-immune status, antepartum  - Will administer MMR prior to discharge     _________________________________    Subjective:   Pain: Well controlled  Tolerating Oral Intake: Yes  Voiding: Yes  Ambulating: Yes  Breastfeeding: Yes  Chest Pain: No  Shortness of Breath: No  Leg Pain/Discomfort: No  Lochia: Normal    Objective:   Vitals:    07/15/22 1610 07/15/22 1840 07/15/22 2300 22 0738   BP: 98/63 121/76 119/75 114/78   BP Location: Left arm Right arm Left arm Left arm   Pulse: 68 67 58 55   Resp: 16 16 18 18   Temp: 98 3 °F (36 8 °C) 98 3 °F (36 8 °C) 98 8 °F (37 1 °C) 97 7 °F (36 5 °C)   TempSrc: Oral Oral Axillary Oral   SpO2: 97% 99% 95% 97%   Weight:       Height:         No intake or output data in the 24 hours ending 22 0946    Physical Exam:  General: in no apparent distress, alert, oriented times 3, afebrile and normal vitals  Abdomen: abdomen is soft without significant tenderness, masses, organomegaly or guarding  Fundus: Firm and non-tender, 1 below the umbilicus  Lower extremeties: nontender    Labs/Tests:   Lab Results   Component Value Date/Time    HGB 12 0 07/15/2022 07:52 AM    HGB 12 8 2022 04:02 PM     (L) 07/15/2022 07:52 AM     2022 04:02 PM    WBC 15 71 (H) 07/15/2022 07:52 AM    WBC 10 94 (H) 2022 04:02 PM        Brief OB Lab review:  ABO Grouping   Date Value Ref Range Status   2022 O  Final      Rh Factor   Date Value Ref Range Status   2022 Positive  Final     Rh Type   Date Value Ref Range Status   2021 Positive Final     Comment:     Please note: Prior records for this patient's ABO / Rh type are not  available for additional verification        No results found for: ANTIBODYSCR  No results found for: RUBM    MEDS:   Current Facility-Administered Medications   Medication Dose Route Frequency    acetaminophen (TYLENOL) tablet 650 mg  650 mg Oral Q4H PRN    benzocaine-menthol-lanolin-aloe (DERMOPLAST) 20-0 5 % topical spray 1 application  1 application Topical G7L PRN    calcium carbonate (TUMS) chewable tablet 1,000 mg  1,000 mg Oral Daily PRN    diphenhydrAMINE (BENADRYL) tablet 25 mg  25 mg Oral Q6H PRN    docusate sodium (COLACE) capsule 100 mg  100 mg Oral BID    hydrocortisone 1 % cream 1 application  1 application Topical Daily PRN    ibuprofen (MOTRIN) tablet 600 mg  600 mg Oral Q6H PRN    measles-mumps-rubella (M-M-R II) subcutaneous injection 0 5 mL  0 5 mL Subcutaneous Once    ondansetron (ZOFRAN) injection 4 mg  4 mg Intravenous Q8H PRN    witch hazel-glycerin (TUCKS) topical pad 1 pad  1 pad Topical Q4H PRN     Invasive Devices  Timeline    None               Dennis Hameed MD  7/16/2022 9:46 AM

## 2022-07-16 NOTE — TELEPHONE ENCOUNTER
Patient is being discharged to home today following normal spontaneous vaginal delivery on 7/15  Her delivery and postpartum course were uncomplicated  She should be scheduled for routine postpartum care in office       Saman Muir MD  7/16/2022 9:48 AM

## 2022-07-26 LAB — PLACENTA IN STORAGE: NORMAL

## 2022-08-26 ENCOUNTER — POSTPARTUM VISIT (OUTPATIENT)
Dept: OBGYN CLINIC | Facility: CLINIC | Age: 37
End: 2022-08-26

## 2022-08-26 VITALS
WEIGHT: 142.4 LBS | SYSTOLIC BLOOD PRESSURE: 118 MMHG | BODY MASS INDEX: 26.2 KG/M2 | HEIGHT: 62 IN | DIASTOLIC BLOOD PRESSURE: 60 MMHG

## 2022-08-26 NOTE — PROGRESS NOTES
[id number], [address], [facility], [address]    Assessment/Plan:  Sarah Cochran is a 40y o  year old  who presents for postpartum visit  Routine Postpartum Care  1  Normal postpartum exam  2  Contraception: Condoms as bridge to vasectomy  3  Depression Screen: Low risk  4  Feeding: Breast (pumping) with bottle supplement  5  Cervical cancer screening Up to Date  6  Follow up in: 3 months for annual exam or as needed  Additional Problems:  1  Postpartum examination following vaginal delivery      Subjective:     CC: Postpartum visit    Kaye Morgan is a 40 y o  y o  female  who presents for a postpartum visit  She is 6 weeks postpartum following  on 7/15/2022 at 40 weeks  Postpartum course has been uncomplicated  Bleeding staining only  Bowel function is normal  Bladder function is normal  Patient is not sexually active  Postpartum Depression: Low Risk     Last EPDS Total Score: 0    Last EPDS Self Harm Result: Never       The following portions of the patient's history were reviewed and updated as appropriate: allergies, current medications, past family history, past medical history, obstetric history, gynecologic history, past social history, past surgical history and problem list     Objective:  /60   Ht 5' 2" (1 575 m)   Wt 64 6 kg (142 lb 6 4 oz)   LMP 10/02/2021 (Exact Date)   Breastfeeding Yes Comment: pumping  BMI 26 05 kg/m²   Pregravid Weight/BMI: 56 2 kg (124 lb) (BMI 23 44)  Current Weight: 64 6 kg (142 lb 6 4 oz)   Total Weight Gain: 16 3 kg (36 lb)   Pre- Vitals    Flowsheet Row Most Recent Value   Prenatal Assessment    Prenatal Vitals    Blood Pressure 118/60   Weight - Scale 64 6 kg (142 lb 6 4 oz)   Urine Albumin/Glucose    Dilation/Effacement/Station    Vaginal Drainage    Edema            Chaperone present? Yes: Temi Cunningham MA  General Appearance: alert and oriented, in no acute distress     Abdomen: Soft, non-tender, non-distended, no masses, no rebound or guarding  Pelvic:       External genitalia: Normal appearance, no abnormal pigmentation, no lesions or masses  Normal Bartholin's and [place]'s  Obstetric laceration well-healed  Urinary system: Urethral meatus normal, bladder non-tender  Vaginal: normal mucosa without prolapse or lesions  Normal-appearing physiologic discharge  Cervix: Normal-appearing, well-epithelialized, no gross lesions or masses  No cervical motion tenderness  Adnexa: No adnexal masses or tenderness noted  Uterus: Normal-sized, regular contour, midline, mobile, no uterine tenderness  Extremities: Normal range of motion     Skin: normal, no rash or abnormalities  Neurologic: alert, oriented x3  Psychiatric: Appropriate affect, mood stable, cooperative with exam         Dennis Hameed MD  8/26/2022 1:08 PM

## 2022-11-18 ENCOUNTER — ANNUAL EXAM (OUTPATIENT)
Dept: OBGYN CLINIC | Facility: CLINIC | Age: 37
End: 2022-11-18

## 2022-11-18 VITALS
WEIGHT: 138 LBS | SYSTOLIC BLOOD PRESSURE: 112 MMHG | BODY MASS INDEX: 25.4 KG/M2 | DIASTOLIC BLOOD PRESSURE: 72 MMHG | HEIGHT: 62 IN

## 2022-11-18 DIAGNOSIS — Z12.4 SCREENING FOR CERVICAL CANCER: ICD-10-CM

## 2022-11-18 DIAGNOSIS — Z01.419 WOMEN'S ANNUAL ROUTINE GYNECOLOGICAL EXAMINATION: Primary | ICD-10-CM

## 2022-11-28 LAB
CYTOLOGIST CVX/VAG CYTO: NORMAL
DX ICD CODE: NORMAL
HPV GENOTYPE REFLEX: NORMAL
HPV I/H RISK 4 DNA CVX QL PROBE+SIG AMP: NEGATIVE
Lab: NORMAL
OTHER STN SPEC: NORMAL
PATH REPORT.FINAL DX SPEC: NORMAL
SL AMB NOTE:: NORMAL
SL AMB SPECIMEN ADEQUACY: NORMAL
SL AMB TEST METHODOLOGY: NORMAL

## 2023-04-20 PROBLEM — N81.10 CYSTOURETHROCELE: Status: ACTIVE | Noted: 2023-04-20

## 2025-03-04 NOTE — OB LABOR/OXYTOCIN SAFETY PROGRESS
Oxytocin Safety Progress Check Note - Nahomi Rodriges 40 y o  female MRN: [id number]    Unit/Bed#: -01 Encounter: [id number]    Dose (leslie-units/min) Oxytocin: 8 leslie-units/min  Contraction Frequency (minutes): 2-3 5  Contraction Quality: Strong  Tachysystole: No   Cervical Dilation: 9  Cervical Effacement: 100  Fetal Station: -1  Baseline Rate: 135 bpm  Fetal Heart Rate: 130 BPM  FHR Category: Category I           Vital Signs:   Vitals:    22 2306   BP: 114/61   Pulse: 64   Resp:    Temp:    SpO2:        Notes/comments:   - Fetal and maternal status reassuring  Patient comfortable following placement of epidural    - First exam in active phase of labor  Excellent progress  Continue pitocin titration  Anticipate        Breonna Stacy MD 2022 11:22 PM SHORTY (acute kidney injury)